# Patient Record
Sex: FEMALE | Race: OTHER | Employment: PART TIME | URBAN - METROPOLITAN AREA
[De-identification: names, ages, dates, MRNs, and addresses within clinical notes are randomized per-mention and may not be internally consistent; named-entity substitution may affect disease eponyms.]

---

## 2017-01-25 ENCOUNTER — OFFICE VISIT (OUTPATIENT)
Dept: FAMILY MEDICINE CLINIC | Facility: CLINIC | Age: 57
End: 2017-01-25

## 2017-01-25 VITALS
HEIGHT: 64 IN | SYSTOLIC BLOOD PRESSURE: 136 MMHG | BODY MASS INDEX: 27.83 KG/M2 | DIASTOLIC BLOOD PRESSURE: 72 MMHG | TEMPERATURE: 98 F | WEIGHT: 163 LBS | HEART RATE: 55 BPM

## 2017-01-25 DIAGNOSIS — H66.011 ACUTE SUPPURATIVE OTITIS MEDIA OF RIGHT EAR WITH SPONTANEOUS RUPTURE OF TYMPANIC MEMBRANE, RECURRENCE NOT SPECIFIED: Primary | ICD-10-CM

## 2017-01-25 PROCEDURE — 99213 OFFICE O/P EST LOW 20 MIN: CPT | Performed by: PHYSICIAN ASSISTANT

## 2017-01-25 PROCEDURE — 99212 OFFICE O/P EST SF 10 MIN: CPT | Performed by: PHYSICIAN ASSISTANT

## 2017-01-25 RX ORDER — AMOXICILLIN AND CLAVULANATE POTASSIUM 875; 125 MG/1; MG/1
1 TABLET, FILM COATED ORAL 2 TIMES DAILY
Qty: 20 TABLET | Refills: 0 | Status: SHIPPED | OUTPATIENT
Start: 2017-01-25 | End: 2017-02-04

## 2017-01-25 NOTE — PROGRESS NOTES
HPI:    Patient ID: Zully Morales is a 55year old female. HPI Comments: Patient presents for pain in right ear that began yesterday. She states felt episode of dizziness earlier today and felt off balance but has since resolved.   She feels chills on and Psychiatric: She has a normal mood and affect.               ASSESSMENT/PLAN:   Acute suppurative otitis media of right ear with spontaneous rupture of tympanic membrane, recurrence not specified  (primary encounter diagnosis)  -Augmentin 875 mg BID for 1

## 2017-10-19 ENCOUNTER — OFFICE VISIT (OUTPATIENT)
Dept: FAMILY MEDICINE CLINIC | Facility: CLINIC | Age: 57
End: 2017-10-19

## 2017-10-19 VITALS
HEART RATE: 64 BPM | WEIGHT: 169 LBS | BODY MASS INDEX: 28.85 KG/M2 | SYSTOLIC BLOOD PRESSURE: 110 MMHG | DIASTOLIC BLOOD PRESSURE: 67 MMHG | HEIGHT: 64 IN

## 2017-10-19 DIAGNOSIS — R93.0 ABNORMAL MRI OF HEAD: ICD-10-CM

## 2017-10-19 DIAGNOSIS — Z23 NEED FOR VACCINATION: ICD-10-CM

## 2017-10-19 DIAGNOSIS — Z00.00 ANNUAL PHYSICAL EXAM: Primary | ICD-10-CM

## 2017-10-19 DIAGNOSIS — N95.1 POST MENOPAUSAL SYNDROME: ICD-10-CM

## 2017-10-19 DIAGNOSIS — Z12.31 VISIT FOR SCREENING MAMMOGRAM: ICD-10-CM

## 2017-10-19 PROCEDURE — 90736 HZV VACCINE LIVE SUBQ: CPT | Performed by: FAMILY MEDICINE

## 2017-10-19 PROCEDURE — 99396 PREV VISIT EST AGE 40-64: CPT | Performed by: FAMILY MEDICINE

## 2017-10-19 PROCEDURE — 90471 IMMUNIZATION ADMIN: CPT | Performed by: FAMILY MEDICINE

## 2017-10-19 NOTE — PROGRESS NOTES
Physical      Patient's past medical surgical family social history was reviewed. Review of Systems  Allergic: no environmental allergies or food allergies  Cardiovascular: no chest pain, irregular heartbeat, or palpitations.   Constitutional: no fever o Future  - XR DEXA BONE DENSITOMETRY (CPT=84852); Future  - LONDON SCREENING BILAT (CPT=77815); Future  - LIPID PANEL; Future  - ASSAY, THYROID STIM HORMONE; Future  - GLUCOSE, SERUM; Future    2.  Abnormal MRI of head  Repeat   Results to Dr. Ivar Councilman  - MRI B

## 2017-11-11 ENCOUNTER — HOSPITAL ENCOUNTER (OUTPATIENT)
Dept: MRI IMAGING | Age: 57
Discharge: HOME | End: 2017-11-11
Attending: FAMILY MEDICINE
Payer: COMMERCIAL

## 2017-11-11 ENCOUNTER — HOSPITAL ENCOUNTER (OUTPATIENT)
Dept: MAMMOGRAPHY | Age: 57
Discharge: HOME | End: 2017-11-11
Attending: FAMILY MEDICINE
Payer: COMMERCIAL

## 2017-11-11 ENCOUNTER — APPOINTMENT (OUTPATIENT)
Dept: LAB | Age: 57
End: 2017-11-11
Attending: FAMILY MEDICINE
Payer: COMMERCIAL

## 2017-11-11 ENCOUNTER — HOSPITAL ENCOUNTER (OUTPATIENT)
Dept: BONE DENSITY | Age: 57
Discharge: HOME | End: 2017-11-11
Attending: FAMILY MEDICINE
Payer: COMMERCIAL

## 2017-11-11 DIAGNOSIS — Z00.00 ANNUAL PHYSICAL EXAM: ICD-10-CM

## 2017-11-11 DIAGNOSIS — Z12.31 VISIT FOR SCREENING MAMMOGRAM: ICD-10-CM

## 2017-11-11 DIAGNOSIS — N95.1 POST MENOPAUSAL SYNDROME: ICD-10-CM

## 2017-11-11 DIAGNOSIS — R93.0 ABNORMAL MRI OF HEAD: ICD-10-CM

## 2017-11-11 PROCEDURE — 36415 COLL VENOUS BLD VENIPUNCTURE: CPT

## 2017-11-11 PROCEDURE — A9575 INJ GADOTERATE MEGLUMI 0.1ML: HCPCS | Performed by: FAMILY MEDICINE

## 2017-11-11 PROCEDURE — 77080 DXA BONE DENSITY AXIAL: CPT | Performed by: FAMILY MEDICINE

## 2017-11-11 PROCEDURE — 80061 LIPID PANEL: CPT

## 2017-11-11 PROCEDURE — 82947 ASSAY GLUCOSE BLOOD QUANT: CPT

## 2017-11-11 PROCEDURE — 70553 MRI BRAIN STEM W/O & W/DYE: CPT | Performed by: FAMILY MEDICINE

## 2017-11-11 PROCEDURE — 77067 SCR MAMMO BI INCL CAD: CPT | Performed by: FAMILY MEDICINE

## 2017-11-11 PROCEDURE — 84443 ASSAY THYROID STIM HORMONE: CPT

## 2017-11-22 ENCOUNTER — TELEPHONE (OUTPATIENT)
Dept: OTHER | Age: 57
End: 2017-11-22

## 2017-11-22 DIAGNOSIS — M85.80 OSTEOPENIA, UNSPECIFIED LOCATION: Primary | ICD-10-CM

## 2017-11-22 RX ORDER — MULTIVIT-MIN/IRON/FOLIC ACID/K 18-600-40
1 CAPSULE ORAL 2 TIMES DAILY
Qty: 60 CAPSULE | Refills: 11 | Status: SHIPPED | OUTPATIENT
Start: 2017-11-22 | End: 2017-12-22

## 2017-11-22 RX ORDER — ERGOCALCIFEROL 1.25 MG/1
50000 CAPSULE ORAL
Qty: 12 CAPSULE | Refills: 3 | Status: SHIPPED | OUTPATIENT
Start: 2017-11-22 | End: 2018-10-04

## 2017-11-22 NOTE — TELEPHONE ENCOUNTER
----- Message from [facility], DO sent at 11/20/2017 10:35 AM CST -----  Mild bone loss  Patient does not have osteoporosis yet but she does have osteopenia which is a step before osteoporosis.   Recheck a vitamin D level diagnosis osteopenia and the

## 2017-11-22 NOTE — TELEPHONE ENCOUNTER
Pt notified of below using  #[id number].  Pt verbalized understanding and was able to repeat instructions, lab order placed, rx sent to pharmacy for prescription strength and otc vitamin d asking if insurance does not pay assist pt with getting corre

## 2017-11-24 ENCOUNTER — APPOINTMENT (OUTPATIENT)
Dept: LAB | Age: 57
End: 2017-11-24
Attending: FAMILY MEDICINE
Payer: COMMERCIAL

## 2017-11-24 DIAGNOSIS — M85.80 OSTEOPENIA, UNSPECIFIED LOCATION: ICD-10-CM

## 2017-11-24 PROCEDURE — 82306 VITAMIN D 25 HYDROXY: CPT

## 2017-11-24 PROCEDURE — 36415 COLL VENOUS BLD VENIPUNCTURE: CPT

## 2017-11-30 ENCOUNTER — TELEPHONE (OUTPATIENT)
Dept: FAMILY MEDICINE CLINIC | Facility: CLINIC | Age: 57
End: 2017-11-30

## 2017-11-30 NOTE — TELEPHONE ENCOUNTER
call #[phone]    Spoke with patient and she states she picked up Vitamin D Rx, but pharmacy needs clarification on Calcium dose.     Spoke with Hamida Fishman at [address] [facility] and verified receipt of Rx and dose and that it is covered by patient's

## 2017-11-30 NOTE — TELEPHONE ENCOUNTER
Egyptian SPEAKER -  Pt calling requesting to speak to nurse. Pt states she was told to take an OTC med along with the rx sent by the Dr but does not recall name or dosage.  Please Adriano Vargas

## 2017-11-30 NOTE — TELEPHONE ENCOUNTER
LMTCB in [address] [facility]. Please soft transfer to Triage any time.       Copied from 11/22/17 encounter:    Message from Celio Max DO sent at 11/20/2017 10:35 AM CST -----  Mild bone loss  Patient does not have osteoporosis yet but she does have osteopenia

## 2017-12-02 ENCOUNTER — TELEPHONE (OUTPATIENT)
Dept: OTHER | Age: 57
End: 2017-12-02

## 2017-12-02 NOTE — TELEPHONE ENCOUNTER
Verified name and . Results/recommendations reviewed with pt and pt verb understanding. Assisted via [employer] and Temnos [id number]. Pt states she did not receive Calcium rx from pharmacy.   Informed pt according to [address] [place] [address] notes on [facility]

## 2017-12-02 NOTE — TELEPHONE ENCOUNTER
----- Message from Aditya Kapoor DO sent at 11/30/2017  1:57 PM CST -----  Vitamin D level is low.   Begin 50,000 units vitamin D weekly #12 with 3 refills

## 2018-01-03 ENCOUNTER — OFFICE VISIT (OUTPATIENT)
Dept: FAMILY MEDICINE CLINIC | Facility: CLINIC | Age: 58
End: 2018-01-03

## 2018-01-03 VITALS
DIASTOLIC BLOOD PRESSURE: 79 MMHG | HEIGHT: 64 IN | BODY MASS INDEX: 28.34 KG/M2 | SYSTOLIC BLOOD PRESSURE: 126 MMHG | WEIGHT: 166 LBS | HEART RATE: 62 BPM

## 2018-01-03 DIAGNOSIS — E55.9 VITAMIN D DEFICIENCY: ICD-10-CM

## 2018-01-03 DIAGNOSIS — R93.0 ABNORMAL MRI OF HEAD: ICD-10-CM

## 2018-01-03 DIAGNOSIS — E78.00 PURE HYPERCHOLESTEROLEMIA: Primary | ICD-10-CM

## 2018-01-03 PROCEDURE — 99212 OFFICE O/P EST SF 10 MIN: CPT | Performed by: FAMILY MEDICINE

## 2018-01-03 PROCEDURE — 99214 OFFICE O/P EST MOD 30 MIN: CPT | Performed by: FAMILY MEDICINE

## 2018-01-03 RX ORDER — IBUPROFEN 200 MG
600 CAPSULE ORAL 2 TIMES DAILY
Qty: 180 TABLET | Refills: 3 | Status: SHIPPED | OUTPATIENT
Start: 2018-01-03

## 2018-01-03 RX ORDER — ATORVASTATIN CALCIUM 20 MG/1
20 TABLET, FILM COATED ORAL NIGHTLY
Qty: 30 TABLET | Refills: 3 | Status: SHIPPED | OUTPATIENT
Start: 2018-01-03 | End: 2018-01-03

## 2018-01-03 RX ORDER — MEFLOQUINE HYDROCHLORIDE 250 MG/1
250 TABLET ORAL
Qty: 6 TABLET | Refills: 0 | Status: SHIPPED | OUTPATIENT
Start: 2018-01-03 | End: 2018-10-04

## 2018-01-03 NOTE — PROGRESS NOTES
Blood pressure 126/79, pulse 62, height 5' 4\" (1.626 m), weight 166 lb (75.3 kg). Presents today following up for MRI results for headaches also low vitamin D levels. She will be traveling to [place] next week and is asking about vaccinations.   She r

## 2018-02-27 ENCOUNTER — OFFICE VISIT (OUTPATIENT)
Dept: FAMILY MEDICINE CLINIC | Facility: CLINIC | Age: 58
End: 2018-02-27

## 2018-02-27 VITALS
HEART RATE: 61 BPM | WEIGHT: 171.13 LBS | DIASTOLIC BLOOD PRESSURE: 78 MMHG | BODY MASS INDEX: 29 KG/M2 | SYSTOLIC BLOOD PRESSURE: 124 MMHG

## 2018-02-27 DIAGNOSIS — E78.00 PURE HYPERCHOLESTEROLEMIA: Primary | ICD-10-CM

## 2018-02-27 PROCEDURE — 99212 OFFICE O/P EST SF 10 MIN: CPT | Performed by: FAMILY MEDICINE

## 2018-02-27 PROCEDURE — 99213 OFFICE O/P EST LOW 20 MIN: CPT | Performed by: FAMILY MEDICINE

## 2018-02-27 RX ORDER — PRAVASTATIN SODIUM 10 MG
10 TABLET ORAL NIGHTLY
Qty: 30 TABLET | Refills: 3 | Status: SHIPPED | OUTPATIENT
Start: 2018-02-27 | End: 2018-10-04

## 2018-02-27 RX ORDER — ATORVASTATIN CALCIUM 20 MG/1
TABLET, FILM COATED ORAL
Refills: 3 | COMMUNITY
Start: 2018-01-31 | End: 2018-02-27

## 2018-02-28 NOTE — PATIENT INSTRUCTIONS
Panel de lípidos  ¿Tiene ana paula análisis otros nombres? Perfil de lípidos, perfil de lipoproteínas. ¿Qué es ana paula análisis? Ana Paula soraida de análisis mide la cantidad de colesterol y otras grasas en mackay vick.   El colesterol y los triglicéridos son Carlotta Stapler · Electrocardiograma o ECG, que evalúa los impulsos eléctricos de mackay corazón para mina si está latiendo con normalidad  · Prueba de esfuerzo cardíaco, en la que usted puede tener que hacer ejercicio mientras es monitoreado por un ECG  · [place]ud[place], que Denisha niveles de colesterol HDL deben ser superiores a 40 mg/dL. En realidad, margie tipo de grasa es buena para usted porque reduce mackay riesgo de enfermedad cardíaca. Cuanto más alto sea el valor, más bajo será mackay riesgo.  Se considera que un nivel de 60 mg/dL Es posible que no deba comer ni beber nada, excepto agua, yancy 12 a 14 horas antes de [facility].  Además, asegúrese de que mackay proveedor de atención médica sepa todos los [address], los productos a base de hierbas, las vitaminas y los suplementos q · Colesterol HDL (lipoproteínas de ray densidad). Ana Paula colesterol es llamado “holden” porque ayuda a eliminar el colesterol LDL de la vick.   · Triglicéridos. Los triglicéridos son Sunitha Roys forma especial de grasa que el organismo [place] para almacenar energía. · Estatinas (inhibidores de la HMG-CoA reductasa). Las estatinas inhiben la producción de colesterol en el organismo y se consideran el mejor medicamento para reducirlo.  Ventajas: Las estatinas reducen el nivel de colesterol LDL, aumentan ligeramente el ni · Ácidos grasos omega-3. Reducen la cantidad de triglicéridos que produce el organismo y [place] a eliminar estos lípidos de la vick.  Los ácidos grasos omega-3 están presentes en muchos alimentos, lina el salmón y otros pescados grasos, así lina e · Adultos que tienen diabetes. O adultos que tienen un mayor riesgo de tener un ataque cardíaco o un ataque cerebral y tienen un nivel de colesterol LDL de 70 a 189 mg/dL.   · Adultos de 21 años o más que tienen un nivel de colesterol LDL de 190 mg/dL o sup ¨ Lleve la cuenta de lo que randall. Si [place] varios medicamentos diferentes, dragan lista o tabla puede ayudarle a nilsa las pastillas correctas en el momento oportuno.  Usar un pastillero con los días de la semana o las horas del día también es dragan buena forma de · Reducir la cantidad de sodio (sal) que come, especialmente, si tiene presión arterial ray  · Aumentar el consumo de frutas y vegetales frescos  · [place] proteínas magras, tales lina pescado, aves y legumbres (frijoles y arvejas o chícharos)   · [place] men Si fuma, deje de hacerlo. [place]dale a mackay proveedor de [employer] acerca de medicamentos que pueden ayudarlo a combatir la compulsión por fumar. Inscribirse en un programa para dejar de fumar puede mejorar mak posibilidades de tener éxito.  Pid Ana Paula análisis de vick mide los niveles de colesterol de mackay cuerpo. Un panel de lípidos mostrará los niveles de mackay colesterol total, mackay colesterol de lipoproteína de baja densidad (\"LDL\", por mak siglas en inglés) o \"darian\" y mackay colesterol de lipoprote Lo ideal es que el colesterol HDL sea superior a 40 mg/dL para los hombres y superior a 50 mg/dL para las mujeres. Cuanto más alto sea mackay nivel de HDL, mejor.   El análisis de colesterol no HDL, por lo general, no forma parte de la medición de mackay colesterol Aunque no existen estándares oscar para los niveles de colesterol no HDL, la mayoría de los expertos en medicina creen que bajar el nivel de colesterol LDL y no HDL al mismo tiempo disminuirá mackay riesgo de enfermedad cardíaca.   Según las [employer] Program © 0537-6361 [address]. [address] [facility], [address] [facility]. Todos los derechos reservados. Esta información no pretende sustituir la atención médica profesional. Sólo mackay médico puede diagnosticar y tratar un problema de ernesto. · De 160 a 189 mg/dL: alto  · Superior o igual a 190 mg/dL: muy alto  Aquí se describen los niveles de colesterol total y cómo afectan la ernesto:  · Menos de 200 mg/dL: tiene un bajo riesgo de enfermedad cardíaca  · De 200 a 240 mg/dL: límite kellen  · [employer] Las personas con antecedentes de enfermedad arterial, ataque cerebral, enfermedad renal o diabetes también tienen un riesgo más alto de enfermedad cardíaca.  Por lo tanto, es posible que también se realicen análisis para detectar estos problemas.   ¿Qué sig Un análisis de lípidos puede realizarse estando o no en ayunas. Sin embargo, si se mide el nivel de mak triglicéridos, es posible que deba estar en [place], lo que significa que solo puede beber agua yancy 12 a 14 horas antes del análisis.     © [id number]

## 2018-02-28 NOTE — PROGRESS NOTES
Blood pressure 124/78, pulse 61, weight 171 lb 2 oz (77.6 kg). Patient presents today following up for hyperlipidemia reports the atorvastatin was making her nauseous. She stopped taking it and felt better. She otherwise feels well.     Objective franke

## 2018-04-16 ENCOUNTER — TELEPHONE (OUTPATIENT)
Dept: FAMILY MEDICINE CLINIC | Facility: CLINIC | Age: 58
End: 2018-04-16

## 2018-04-16 DIAGNOSIS — Z91.89 ENCOUNTER FOR HEPATITIS C VIRUS SCREENING TEST FOR HIGH RISK PATIENT: Primary | ICD-10-CM

## 2018-04-16 DIAGNOSIS — Z11.59 ENCOUNTER FOR HEPATITIS C VIRUS SCREENING TEST FOR HIGH RISK PATIENT: Primary | ICD-10-CM

## 2018-04-17 ENCOUNTER — LAB ENCOUNTER (OUTPATIENT)
Dept: LAB | Age: 58
End: 2018-04-17
Attending: FAMILY MEDICINE
Payer: COMMERCIAL

## 2018-04-17 DIAGNOSIS — Z91.89 ENCOUNTER FOR HEPATITIS C VIRUS SCREENING TEST FOR HIGH RISK PATIENT: ICD-10-CM

## 2018-04-17 DIAGNOSIS — E78.00 PURE HYPERCHOLESTEROLEMIA: ICD-10-CM

## 2018-04-17 DIAGNOSIS — Z11.59 ENCOUNTER FOR HEPATITIS C VIRUS SCREENING TEST FOR HIGH RISK PATIENT: ICD-10-CM

## 2018-04-17 PROCEDURE — 80500 HEPATITIS A B + C PROFILE: CPT

## 2018-04-17 PROCEDURE — 86708 HEPATITIS A ANTIBODY: CPT

## 2018-04-17 PROCEDURE — 82306 VITAMIN D 25 HYDROXY: CPT

## 2018-04-17 PROCEDURE — 36415 COLL VENOUS BLD VENIPUNCTURE: CPT

## 2018-04-17 PROCEDURE — 86709 HEPATITIS A IGM ANTIBODY: CPT

## 2018-04-17 PROCEDURE — 87340 HEPATITIS B SURFACE AG IA: CPT

## 2018-04-17 PROCEDURE — 86704 HEP B CORE ANTIBODY TOTAL: CPT

## 2018-04-17 PROCEDURE — 86706 HEP B SURFACE ANTIBODY: CPT

## 2018-04-17 PROCEDURE — 86803 HEPATITIS C AB TEST: CPT

## 2018-04-20 ENCOUNTER — TELEPHONE (OUTPATIENT)
Dept: FAMILY MEDICINE CLINIC | Facility: CLINIC | Age: 58
End: 2018-04-20

## 2018-04-25 NOTE — TELEPHONE ENCOUNTER
Advised patient on Dr. Eduardo Garcia information. Patient verbalized understanding, had no questions.      Sushma Knight #[id number]

## 2018-10-04 ENCOUNTER — OFFICE VISIT (OUTPATIENT)
Dept: FAMILY MEDICINE CLINIC | Facility: CLINIC | Age: 58
End: 2018-10-04
Payer: COMMERCIAL

## 2018-10-04 VITALS
BODY MASS INDEX: 30.07 KG/M2 | HEIGHT: 64 IN | DIASTOLIC BLOOD PRESSURE: 78 MMHG | WEIGHT: 176.13 LBS | SYSTOLIC BLOOD PRESSURE: 139 MMHG | HEART RATE: 62 BPM

## 2018-10-04 DIAGNOSIS — K63.5 POLYP OF COLON, UNSPECIFIED PART OF COLON, UNSPECIFIED TYPE: Primary | ICD-10-CM

## 2018-10-04 DIAGNOSIS — E55.9 VITAMIN D DEFICIENCY: ICD-10-CM

## 2018-10-04 DIAGNOSIS — E78.00 PURE HYPERCHOLESTEROLEMIA: ICD-10-CM

## 2018-10-04 PROCEDURE — 99212 OFFICE O/P EST SF 10 MIN: CPT | Performed by: FAMILY MEDICINE

## 2018-10-04 PROCEDURE — 99213 OFFICE O/P EST LOW 20 MIN: CPT | Performed by: FAMILY MEDICINE

## 2018-10-04 NOTE — PROGRESS NOTES
Blood pressure 139/78, pulse 62, height 5' 4\" (1.626 m), weight 176 lb 2 oz (79.9 kg). Patient presents today following up for hyperlipidemia had ear pain for the past several days but has improved. No fever no chills over the past day.   She does not

## 2018-10-27 ENCOUNTER — APPOINTMENT (OUTPATIENT)
Dept: LAB | Age: 58
End: 2018-10-27
Attending: FAMILY MEDICINE
Payer: COMMERCIAL

## 2018-10-27 DIAGNOSIS — E55.9 VITAMIN D DEFICIENCY: ICD-10-CM

## 2018-10-27 DIAGNOSIS — E78.00 PURE HYPERCHOLESTEROLEMIA: ICD-10-CM

## 2018-10-27 PROCEDURE — 84460 ALANINE AMINO (ALT) (SGPT): CPT

## 2018-10-27 PROCEDURE — 82306 VITAMIN D 25 HYDROXY: CPT

## 2018-10-27 PROCEDURE — 84443 ASSAY THYROID STIM HORMONE: CPT

## 2018-10-27 PROCEDURE — 80048 BASIC METABOLIC PNL TOTAL CA: CPT

## 2018-10-27 PROCEDURE — 36415 COLL VENOUS BLD VENIPUNCTURE: CPT

## 2018-10-27 PROCEDURE — 84450 TRANSFERASE (AST) (SGOT): CPT

## 2018-10-27 PROCEDURE — 80061 LIPID PANEL: CPT

## 2018-10-29 RX ORDER — ERGOCALCIFEROL 1.25 MG/1
CAPSULE ORAL
Qty: 12 CAPSULE | Refills: 11 | Status: SHIPPED | OUTPATIENT
Start: 2018-10-29 | End: 2019-12-17

## 2018-10-29 NOTE — TELEPHONE ENCOUNTER
Review pended refill request as it does not fall under a protocol.   [facility]. [facility] do you approve refill request? Last Vit D was done on 10/27/2018 it was 25.2    Last Rx: 000  LOV: 000

## 2018-11-15 ENCOUNTER — TELEPHONE (OUTPATIENT)
Dept: FAMILY MEDICINE CLINIC | Facility: CLINIC | Age: 58
End: 2018-11-15

## 2018-11-15 ENCOUNTER — OFFICE VISIT (OUTPATIENT)
Dept: GASTROENTEROLOGY | Facility: CLINIC | Age: 58
End: 2018-11-15
Payer: COMMERCIAL

## 2018-11-15 ENCOUNTER — TELEPHONE (OUTPATIENT)
Dept: GASTROENTEROLOGY | Facility: CLINIC | Age: 58
End: 2018-11-15

## 2018-11-15 VITALS
HEIGHT: 64 IN | SYSTOLIC BLOOD PRESSURE: 140 MMHG | HEART RATE: 60 BPM | DIASTOLIC BLOOD PRESSURE: 80 MMHG | BODY MASS INDEX: 30.22 KG/M2 | WEIGHT: 177 LBS

## 2018-11-15 DIAGNOSIS — K63.5 POLYP OF COLON, UNSPECIFIED PART OF COLON, UNSPECIFIED TYPE: Primary | ICD-10-CM

## 2018-11-15 DIAGNOSIS — K64.8 INTERNAL HEMORRHOIDS: ICD-10-CM

## 2018-11-15 DIAGNOSIS — Z86.010 HX OF COLONIC POLYPS: Primary | ICD-10-CM

## 2018-11-15 DIAGNOSIS — K62.5 RECTAL BLEEDING: ICD-10-CM

## 2018-11-15 PROCEDURE — 99243 OFF/OP CNSLTJ NEW/EST LOW 30: CPT | Performed by: INTERNAL MEDICINE

## 2018-11-15 PROCEDURE — 99212 OFFICE O/P EST SF 10 MIN: CPT | Performed by: INTERNAL MEDICINE

## 2018-11-15 NOTE — PATIENT INSTRUCTIONS
Colonoscopy for history of colon polyp ( 2013)  - colyte prep  - IV or MAC sedation    Please see Dr. Aileen Cassidy about your breathing issues ( before the colonoscopy)

## 2018-11-15 NOTE — TELEPHONE ENCOUNTER
Please contact patient needs to schedule appt to see me. Received note from Dr. Mak Beaver patient complaining of intermittent SOB.

## 2018-11-15 NOTE — PROGRESS NOTES
Sp Moreno is a 65year old female. HPI:   Patient presents with:  Colonoscopy Screening    The patient is a 64-year-old female who has a history of colon polyp x1 and internal hemorrhoids noted on prior colonoscopy from 2013.   She is here today to s dose. Take as directed Disp: 1 Bottle Rfl: 0   ERGOCALCIFEROL 53405 units Oral Cap TAKE ONE CAPSULE BY MOUTH EVERY 7 DAYS Disp: 12 capsule Rfl: 11   Calcium 600 MG Oral Tab Take 600 mg by mouth 2 (two) times daily.  Disp: 180 tablet Rfl: 3       Allergies: Visit:  Requested Prescriptions     Signed Prescriptions Disp Refills   • PEG 3350-KCl-NaBcb-NaCl-NaSulf (COLYTE WITH FLAVOR PACKS) 240 g Oral Recon Soln 1 Bottle 0     Sig: Take 4,000 mL (4 L total) by mouth one time for 1 dose.  Take as directed       Lora

## 2018-11-16 NOTE — TELEPHONE ENCOUNTER
Scheduled for:  Colonoscopy 51895  Provider Name: Dr Bonnie Call  Date:  Sat 1/19/19  Location:  [facility]  Sedation:  IV  Time:  9:00 am  Prep: split dose colyte  Meds/Allergies Reconciled?:  NKDA  Diagnosis with codes:  HCP Z86.010  Was patient informed to call insu

## 2018-11-21 ENCOUNTER — LAB ENCOUNTER (OUTPATIENT)
Dept: LAB | Age: 58
End: 2018-11-21
Attending: FAMILY MEDICINE
Payer: COMMERCIAL

## 2018-11-21 ENCOUNTER — HOSPITAL ENCOUNTER (OUTPATIENT)
Dept: GENERAL RADIOLOGY | Age: 58
Discharge: HOME | End: 2018-11-21
Attending: FAMILY MEDICINE
Payer: COMMERCIAL

## 2018-11-21 ENCOUNTER — OFFICE VISIT (OUTPATIENT)
Dept: FAMILY MEDICINE CLINIC | Facility: CLINIC | Age: 58
End: 2018-11-21
Payer: COMMERCIAL

## 2018-11-21 VITALS
DIASTOLIC BLOOD PRESSURE: 72 MMHG | HEIGHT: 64 IN | WEIGHT: 177 LBS | SYSTOLIC BLOOD PRESSURE: 114 MMHG | HEART RATE: 66 BPM | BODY MASS INDEX: 30.22 KG/M2

## 2018-11-21 DIAGNOSIS — R06.00 DOE (DYSPNEA ON EXERTION): ICD-10-CM

## 2018-11-21 DIAGNOSIS — R06.00 DOE (DYSPNEA ON EXERTION): Primary | ICD-10-CM

## 2018-11-21 PROCEDURE — 93010 ELECTROCARDIOGRAM REPORT: CPT | Performed by: FAMILY MEDICINE

## 2018-11-21 PROCEDURE — 93005 ELECTROCARDIOGRAM TRACING: CPT

## 2018-11-21 PROCEDURE — 99214 OFFICE O/P EST MOD 30 MIN: CPT | Performed by: FAMILY MEDICINE

## 2018-11-21 PROCEDURE — 36415 COLL VENOUS BLD VENIPUNCTURE: CPT

## 2018-11-21 PROCEDURE — 85025 COMPLETE CBC W/AUTO DIFF WBC: CPT

## 2018-11-21 PROCEDURE — 71046 X-RAY EXAM CHEST 2 VIEWS: CPT | Performed by: FAMILY MEDICINE

## 2018-11-21 NOTE — PROGRESS NOTES
Blood pressure 114/72, pulse 66, height 5' 4\" (1.626 m), weight 177 lb (80.3 kg). Patient presents today complaining of a one month history of feeling dyspneic on exertion. Particularly when going up stairs.   Occasional palpitations denies any  syncop

## 2018-11-23 ENCOUNTER — TELEPHONE (OUTPATIENT)
Dept: FAMILY MEDICINE CLINIC | Facility: CLINIC | Age: 58
End: 2018-11-23

## 2018-11-23 NOTE — TELEPHONE ENCOUNTER
----- Message from Gwen Myers DO sent at 11/21/2018  5:08 PM CST -----  No anemia on cbc patient to proceed with cardiac studies ordered including stress echo when approved by insurance.

## 2018-12-01 NOTE — TELEPHONE ENCOUNTER
Spoke to patient verified information. Made patient aware of results, patient verbalized understanding.

## 2019-01-15 ENCOUNTER — TELEPHONE (OUTPATIENT)
Dept: FAMILY MEDICINE CLINIC | Facility: CLINIC | Age: 59
End: 2019-01-15

## 2019-01-15 RX ORDER — MEFLOQUINE HYDROCHLORIDE 250 MG/1
250 TABLET ORAL
Qty: 7 TABLET | Refills: 0 | Status: SHIPPED | OUTPATIENT
Start: 2019-01-15 | End: 2019-06-27

## 2019-01-15 NOTE — TELEPHONE ENCOUNTER
SEE ABOVE PATIENT TO START TAKING MEFLOQUINE WEEKLY 2 WEEKS PRIOR TO DEPARTURE WEEKLY WHILE IN [place] AND WEEKLY FOR FOUR WEEKS AFTER RETURN No

## 2019-01-15 NOTE — TELEPHONE ENCOUNTER
Spoke with patient who confirmed she will be traveling to the [facility] of [facility], and her return date is 1/31/19. Routed to provider for review.      Please reply to donna: TANNA Zepeda

## 2019-01-15 NOTE — TELEPHONE ENCOUNTER
Message routed to provider to confirm if an rx for chloroquine can be provided to the patient.      Please reply to pool: TANNA Plata

## 2019-01-19 ENCOUNTER — HOSPITAL ENCOUNTER (OUTPATIENT)
Facility: HOSPITAL | Age: 59
Setting detail: HOSPITAL OUTPATIENT SURGERY
Discharge: HOME | End: 2019-01-19
Attending: INTERNAL MEDICINE | Admitting: INTERNAL MEDICINE
Payer: COMMERCIAL

## 2019-01-19 VITALS
HEART RATE: 58 BPM | SYSTOLIC BLOOD PRESSURE: 125 MMHG | BODY MASS INDEX: 29.02 KG/M2 | WEIGHT: 170 LBS | DIASTOLIC BLOOD PRESSURE: 67 MMHG | OXYGEN SATURATION: 100 % | RESPIRATION RATE: 15 BRPM | HEIGHT: 64 IN

## 2019-01-19 DIAGNOSIS — Z86.010 HX OF COLONIC POLYPS: ICD-10-CM

## 2019-01-19 PROCEDURE — 0DBK8ZX EXCISION OF ASCENDING COLON, VIA NATURAL OR ARTIFICIAL OPENING ENDOSCOPIC, DIAGNOSTIC: ICD-10-PCS | Performed by: INTERNAL MEDICINE

## 2019-01-19 PROCEDURE — 45380 COLONOSCOPY AND BIOPSY: CPT | Performed by: INTERNAL MEDICINE

## 2019-01-19 PROCEDURE — 0DBL8ZX EXCISION OF TRANSVERSE COLON, VIA NATURAL OR ARTIFICIAL OPENING ENDOSCOPIC, DIAGNOSTIC: ICD-10-PCS | Performed by: INTERNAL MEDICINE

## 2019-01-19 PROCEDURE — G0500 MOD SEDAT ENDO SERVICE >5YRS: HCPCS | Performed by: INTERNAL MEDICINE

## 2019-01-19 PROCEDURE — 45385 COLONOSCOPY W/LESION REMOVAL: CPT | Performed by: INTERNAL MEDICINE

## 2019-01-19 RX ORDER — SODIUM CHLORIDE 0.9 % (FLUSH) 0.9 %
10 SYRINGE (ML) INJECTION AS NEEDED
Status: DISCONTINUED | OUTPATIENT
Start: 2019-01-19 | End: 2019-01-19

## 2019-01-19 RX ORDER — MIDAZOLAM HYDROCHLORIDE 1 MG/ML
1 INJECTION INTRAMUSCULAR; INTRAVENOUS EVERY 5 MIN PRN
Status: DISCONTINUED | OUTPATIENT
Start: 2019-01-19 | End: 2019-01-19

## 2019-01-19 RX ORDER — SODIUM CHLORIDE, SODIUM LACTATE, POTASSIUM CHLORIDE, CALCIUM CHLORIDE 600; 310; 30; 20 MG/100ML; MG/100ML; MG/100ML; MG/100ML
INJECTION, SOLUTION INTRAVENOUS CONTINUOUS
Status: DISCONTINUED | OUTPATIENT
Start: 2019-01-19 | End: 2019-01-19

## 2019-01-19 RX ORDER — MIDAZOLAM HYDROCHLORIDE 1 MG/ML
INJECTION INTRAMUSCULAR; INTRAVENOUS
Status: DISCONTINUED | OUTPATIENT
Start: 2019-01-19 | End: 2019-01-19

## 2019-01-19 NOTE — OPERATIVE REPORT
[facility] HOSP - [facility] Endoscopy Report      Preoperative Diagnosis:  - history of colon polyp - 2013 last exam    Postoperative Diagnosis:  - colon polyp x 2  - diverticulosis  - internal hemorrhoids      Procedure:    Colonoscopy      Surgeon:  Margot Ferrer

## 2019-01-19 NOTE — H&P
History & Physical Examination    Patient Name: Deborah Box  MRN: [id number]  [facility]: [id number]  YOB: 1960    Diagnosis: history of colon polyp 2013  Perianal lesion    Cardiac workup review, EKG unchanged. No current symptoms.      Davie Li Smokeless tobacco: Never Used    Alcohol use: No      Alcohol/week: 0.0 oz      SYSTEM Check if Review is Normal Check if Physical Exam is Normal If not normal, please explain:   HEENT [x ] [ x]    NECK & BACK [x ] [x ]    HEART [x ] [ x]    LUNGS [x ]

## 2019-01-31 ENCOUNTER — TELEPHONE (OUTPATIENT)
Dept: GASTROENTEROLOGY | Facility: CLINIC | Age: 59
End: 2019-01-31

## 2019-01-31 NOTE — TELEPHONE ENCOUNTER
----- Message from Elizabeth Mayberry MD sent at 1/29/2019  6:03 PM CST -----  RN place on call back for colon for 5 years  - please contact pt - [place] speaking  - follow up with primary to complete heart workup  - colon polyps ok - adenomatous and thus ca

## 2019-02-22 NOTE — TELEPHONE ENCOUNTER
# [id number]    Patient returned call. Name and  verified. Patient informed of results and recommendations. Patient verbalized understanding. Phone number for surgeon given. 5 Year colonoscopy recall placed in system per Dr. Lissett Bee .  Colonosc

## 2019-02-24 NOTE — TELEPHONE ENCOUNTER
Review pended refill request as it does not fall under a protocol.     Last Rx: 1-15-19  LOV: 11-21-18

## 2019-02-25 RX ORDER — MEFLOQUINE HYDROCHLORIDE 250 MG/1
TABLET ORAL
Qty: 7 TABLET | Refills: 0 | OUTPATIENT
Start: 2019-02-25

## 2019-02-25 NOTE — TELEPHONE ENCOUNTER
Anand/Zeke's [phone] is calling for status of medication refill request. Per Lowella Dakin pt is out of medication.

## 2019-02-26 NOTE — TELEPHONE ENCOUNTER
Current Outpatient Medications:   •  Mefloquine HCl 250 MG Oral Tab, Take 1 tablet (250 mg total) by mouth every 7 days. , Disp: 7 tablet, Rfl: 0    Second request, please advise

## 2019-02-27 NOTE — TELEPHONE ENCOUNTER
Patient will need one tab weekly for four weeks after return. Please inquire how many more tabs patient will need to satisfy this requirement.

## 2019-02-28 NOTE — TELEPHONE ENCOUNTER
Pt called and stated returned on 1/31/19 and has had 3 pills or 3 doses since. Pt has 1 final pill for today.  No additional medication will be needed

## 2019-06-27 ENCOUNTER — OFFICE VISIT (OUTPATIENT)
Dept: FAMILY MEDICINE CLINIC | Facility: CLINIC | Age: 59
End: 2019-06-27
Payer: COMMERCIAL

## 2019-06-27 VITALS
TEMPERATURE: 98 F | WEIGHT: 177 LBS | HEART RATE: 64 BPM | BODY MASS INDEX: 30.22 KG/M2 | HEIGHT: 64 IN | DIASTOLIC BLOOD PRESSURE: 64 MMHG | SYSTOLIC BLOOD PRESSURE: 106 MMHG

## 2019-06-27 DIAGNOSIS — E78.00 PURE HYPERCHOLESTEROLEMIA: ICD-10-CM

## 2019-06-27 DIAGNOSIS — Z12.31 SCREENING MAMMOGRAM, ENCOUNTER FOR: Primary | ICD-10-CM

## 2019-06-27 DIAGNOSIS — E55.9 VITAMIN D DEFICIENCY: ICD-10-CM

## 2019-06-27 DIAGNOSIS — R06.00 DOE (DYSPNEA ON EXERTION): ICD-10-CM

## 2019-06-27 PROCEDURE — 99212 OFFICE O/P EST SF 10 MIN: CPT | Performed by: FAMILY MEDICINE

## 2019-06-27 PROCEDURE — 99214 OFFICE O/P EST MOD 30 MIN: CPT | Performed by: FAMILY MEDICINE

## 2019-06-27 NOTE — PROGRESS NOTES
Blood pressure 106/64, pulse 64, temperature 98.2 °F (36.8 °C), height 5' 4\" (1.626 m), weight 177 lb (80.3 kg). Following up for dyspnea on exertion. Also with hyperlipidemia. Denies chest pain she does get dyspneic ascending and descending stairs.

## 2019-06-27 NOTE — PATIENT INSTRUCTIONS
Síndrome De La Articulación Temporomandibular (EDUAR)[TMJ Syndrome]  Se trata de dragan afección con dolor crónico o recurrente en la articulación de la mandíbula (junto al oído). Ana Paula dolor puede limitar el movimiento de la [place].  También es posible que a) Intente identificar qué cosas de mackay vandana le ocasionan estrés. Hayde Michaela no sea obvio. Pueden ser, por ejemplo:  -- Complicaciones diarias que se van acumulando (embotellamientos, citas a las que no pudo asistir, problemas con el automóvil).   -- Cambios de Programe dragan VISITA DE CONTROL según le indiquen con un dentista o cirujano dental. Es posible que deba hacerse más pruebas o le indiquen algún tratamiento adicional. Si rechina los dientes por la noche, puede resultarle útil usar un dispositivo de plástic Acostúmbrese a evaluar el estado físico de mackay cuerpo varias veces al día. Pruebe a escribir dragan nota lina recordatorio o ponga un despertador en mackay reloj o en la computadora. Al hacerse dragan autorrevisión, pregúntese lo siguiente:  · ¿Me siento estresado? · Pida ayuda si la necesita. Mak amigos o mak familiares podrían hacerle diligencias, prepararle comidas y acompañarlo en mak paseos u otros tipos de ejercicio.   Manténgase activo  La actividad ayuda al cuerpo de muchas maneras; por ejemplo, ayuda a perman

## 2019-06-28 ENCOUNTER — LAB ENCOUNTER (OUTPATIENT)
Dept: LAB | Age: 59
End: 2019-06-28
Attending: FAMILY MEDICINE
Payer: COMMERCIAL

## 2019-06-28 ENCOUNTER — HOSPITAL ENCOUNTER (OUTPATIENT)
Dept: GENERAL RADIOLOGY | Age: 59
Discharge: HOME | End: 2019-06-28
Attending: FAMILY MEDICINE
Payer: COMMERCIAL

## 2019-06-28 DIAGNOSIS — E78.00 PURE HYPERCHOLESTEROLEMIA: ICD-10-CM

## 2019-06-28 DIAGNOSIS — Z12.31 SCREENING MAMMOGRAM, ENCOUNTER FOR: Primary | ICD-10-CM

## 2019-06-28 DIAGNOSIS — R06.00 DOE (DYSPNEA ON EXERTION): ICD-10-CM

## 2019-06-28 DIAGNOSIS — E55.9 VITAMIN D DEFICIENCY: ICD-10-CM

## 2019-06-28 PROCEDURE — 84450 TRANSFERASE (AST) (SGOT): CPT

## 2019-06-28 PROCEDURE — 93005 ELECTROCARDIOGRAM TRACING: CPT

## 2019-06-28 PROCEDURE — 80061 LIPID PANEL: CPT

## 2019-06-28 PROCEDURE — 93010 ELECTROCARDIOGRAM REPORT: CPT | Performed by: FAMILY MEDICINE

## 2019-06-28 PROCEDURE — 85025 COMPLETE CBC W/AUTO DIFF WBC: CPT

## 2019-06-28 PROCEDURE — 84460 ALANINE AMINO (ALT) (SGPT): CPT

## 2019-06-28 PROCEDURE — 36415 COLL VENOUS BLD VENIPUNCTURE: CPT

## 2019-06-28 PROCEDURE — 82306 VITAMIN D 25 HYDROXY: CPT

## 2019-06-28 PROCEDURE — 80048 BASIC METABOLIC PNL TOTAL CA: CPT

## 2019-06-28 PROCEDURE — 71046 X-RAY EXAM CHEST 2 VIEWS: CPT | Performed by: FAMILY MEDICINE

## 2019-06-28 PROCEDURE — 84443 ASSAY THYROID STIM HORMONE: CPT

## 2019-11-14 RX ORDER — ERGOCALCIFEROL 1.25 MG/1
CAPSULE ORAL
Qty: 12 CAPSULE | Refills: 1 | OUTPATIENT
Start: 2019-11-14

## 2019-11-14 NOTE — TELEPHONE ENCOUNTER
Review pended refill request as it does not fall under a protocol.     Requested Prescriptions     Pending Prescriptions Disp Refills   • ERGOCALCIFEROL 1.25 MG (76683 UT) Oral Cap [Pharmacy Med Name: VITAMIN D2 50,000IU (ERGO) CAP RX] 12 capsule 0     Sig:

## 2019-11-16 ENCOUNTER — APPOINTMENT (OUTPATIENT)
Dept: LAB | Age: 59
End: 2019-11-16
Attending: FAMILY MEDICINE
Payer: COMMERCIAL

## 2019-11-16 DIAGNOSIS — E78.00 PURE HYPERCHOLESTEROLEMIA: ICD-10-CM

## 2019-11-16 DIAGNOSIS — E55.9 VITAMIN D DEFICIENCY: ICD-10-CM

## 2019-11-16 PROCEDURE — 80061 LIPID PANEL: CPT

## 2019-11-16 PROCEDURE — 82306 VITAMIN D 25 HYDROXY: CPT

## 2019-11-16 PROCEDURE — 84460 ALANINE AMINO (ALT) (SGPT): CPT

## 2019-11-16 PROCEDURE — 36415 COLL VENOUS BLD VENIPUNCTURE: CPT

## 2019-11-16 PROCEDURE — 84450 TRANSFERASE (AST) (SGOT): CPT

## 2019-11-16 PROCEDURE — 84443 ASSAY THYROID STIM HORMONE: CPT

## 2019-11-19 ENCOUNTER — TELEPHONE (OUTPATIENT)
Dept: FAMILY MEDICINE CLINIC | Facility: CLINIC | Age: 59
End: 2019-11-19

## 2019-11-19 NOTE — TELEPHONE ENCOUNTER
----- Message from Josefina Cadena DO sent at 11/18/2019  5:12 PM CST -----  Cholesterol elevated vitamin d decreased. Statin and vitamin d supplementation sent. Fu blood test 3 months.

## 2019-11-29 ENCOUNTER — HOSPITAL ENCOUNTER (OUTPATIENT)
Age: 59
Discharge: HOME | End: 2019-11-29
Attending: EMERGENCY MEDICINE
Payer: COMMERCIAL

## 2019-11-29 VITALS
RESPIRATION RATE: 18 BRPM | DIASTOLIC BLOOD PRESSURE: 75 MMHG | OXYGEN SATURATION: 96 % | HEART RATE: 79 BPM | HEIGHT: 64 IN | BODY MASS INDEX: 28.17 KG/M2 | SYSTOLIC BLOOD PRESSURE: 117 MMHG | TEMPERATURE: 98 F | WEIGHT: 165 LBS

## 2019-11-29 DIAGNOSIS — J01.00 ACUTE NON-RECURRENT MAXILLARY SINUSITIS: Primary | ICD-10-CM

## 2019-11-29 PROCEDURE — 99213 OFFICE O/P EST LOW 20 MIN: CPT

## 2019-11-29 PROCEDURE — 99203 OFFICE O/P NEW LOW 30 MIN: CPT

## 2019-11-29 RX ORDER — ECHINACEA PURPUREA EXTRACT 125 MG
2 TABLET ORAL AS NEEDED
Qty: 60 ML | Refills: 0 | Status: SHIPPED | OUTPATIENT
Start: 2019-11-29 | End: 2019-12-04

## 2019-11-29 RX ORDER — AMOXICILLIN AND CLAVULANATE POTASSIUM 875; 125 MG/1; MG/1
1 TABLET, FILM COATED ORAL 2 TIMES DAILY
Qty: 20 TABLET | Refills: 0 | Status: SHIPPED | OUTPATIENT
Start: 2019-11-29 | End: 2019-12-09

## 2019-11-29 RX ORDER — FLUTICASONE PROPIONATE 50 MCG
2 SPRAY, SUSPENSION (ML) NASAL DAILY
Qty: 16 G | Refills: 0 | Status: SHIPPED | OUTPATIENT
Start: 2019-11-29 | End: 2019-12-29

## 2019-11-29 NOTE — ED PROVIDER NOTES
Patient Seen in: [facility] In [address] [place] [address]    History   Patient presents with:  Sore Throat    Stated Complaint: sore throat    HPI      HISTORY OF PRESENT ILLNESS:Patient complains of URI symptoms for 4-5 days.   Complains of sinus con Colon Cancer Other         Close relative/No Family hx of Colon Cancer   • Breast Cancer Neg         No Family h/o Breast Cancer     Family history reviewed with patient/caregiver and is not pertinent to presenting problem.    Social History    Tobacco Use symptoms worsen      Medications Prescribed:  Current Discharge Medication List    START taking these medications    Amoxicillin-Pot Clavulanate 875-125 MG Oral Tab  Take 1 tablet by mouth 2 (two) times daily for 10 days.   Qty: 20 tablet Refills: 0    Darlys Lamp

## 2019-12-17 RX ORDER — ERGOCALCIFEROL 1.25 MG/1
CAPSULE ORAL
Qty: 12 CAPSULE | Refills: 0 | Status: SHIPPED | OUTPATIENT
Start: 2019-12-17

## 2019-12-18 NOTE — TELEPHONE ENCOUNTER
Review pended refill request as it does not fall under a protocol.     Requested Prescriptions     Pending Prescriptions Disp Refills   • ERGOCALCIFEROL 1.25 MG (02983 UT) Oral Cap [Pharmacy Med Name: VITAMIN D2 50,000IU (ERGO) CAP RX] 12 capsule 0     Sig:

## 2019-12-26 ENCOUNTER — OFFICE VISIT (OUTPATIENT)
Dept: FAMILY MEDICINE CLINIC | Facility: CLINIC | Age: 59
End: 2019-12-26
Payer: COMMERCIAL

## 2019-12-26 VITALS
HEIGHT: 64 IN | WEIGHT: 173.25 LBS | HEART RATE: 67 BPM | DIASTOLIC BLOOD PRESSURE: 76 MMHG | SYSTOLIC BLOOD PRESSURE: 136 MMHG | BODY MASS INDEX: 29.58 KG/M2

## 2019-12-26 DIAGNOSIS — M26.623 BILATERAL TEMPOROMANDIBULAR JOINT PAIN: ICD-10-CM

## 2019-12-26 DIAGNOSIS — E78.00 PURE HYPERCHOLESTEROLEMIA: ICD-10-CM

## 2019-12-26 DIAGNOSIS — D17.23 LIPOMA OF RIGHT LOWER EXTREMITY: Primary | ICD-10-CM

## 2019-12-26 PROCEDURE — 99214 OFFICE O/P EST MOD 30 MIN: CPT | Performed by: FAMILY MEDICINE

## 2019-12-26 RX ORDER — LEVOFLOXACIN 750 MG/1
750 TABLET ORAL DAILY
Qty: 5 TABLET | Refills: 0 | Status: SHIPPED | OUTPATIENT
Start: 2019-12-26 | End: 2020-08-17

## 2019-12-26 NOTE — PROGRESS NOTES
Blood pressure 136/76, pulse 67, height 5' 4\" (1.626 m), weight 173 lb 4 oz (78.6 kg). patient presents today complaining of persistent ear pain.   She was treated with antibiotics for sinus infection at the end of November took the Augmentin does no

## 2019-12-26 NOTE — PATIENT INSTRUCTIONS
Tratamiento dental para trastornos temporomandibulares (TTM)  Le hernández dado un diagnóstico de trastorno temporomandibular (TTM).  Ana Paula término describe un soraida de problemas relacionados con la articulación temporomandibular (EDUAR) y los músculos asociados a ? Debe limpiarse antes de ponerla en la boca y después de sacarla. Pregunte a mackay dentista u ortodoncista cómo limpiar la férula.   ? Debe guardarse en un estuche protector, fuera del alcance de los niños y de los Qaqortoq domésticos, para evitar que se ensu © 4314-6231 [address]. [address] [facility], 16[address] [facility]. Todos los derechos reservados. Esta información no pretende sustituir la atención médica profesional. Sólo mackay médico puede diagnosticar y tratar un problema de ernesto. · Relajantes musculares. Tratan el dolor miofascial, el cual afecta los tejidos blandos o los músculos que rodean la EDUAR.  Estos relajantes ayudan a aliviar la tensión muscular, con lo que se reduce la presión que los músculos de la mandíbula ejercen en la · Manipulación suave para reducir el dolor y restablecer la amplitud de [place]. El proveedor de atención médica [place] las ghazala para relajar los músculos y los ligamentos que rodean la articulación.   · Ejercicios para fortalecer los músculos de la ma Gino tiene un trastorno temporomandibular (TTM). Ana Paula término describe un soraida de problemas relacionados con la articulación temporomandibular (EDUAR) y los músculos asociados a yuly.  La EDUAR está situada en el punto de unión de los maxilares superior e inf El estrés es un factor esencial en los TTM: puede causar tensión muscular y rechinamiento de dientes, incluso alterar el sueño y reducir la capacidad del cuerpo para sanarse.  Aquí tiene algunos consejos para controlar el estrés:  · Aprenda técnicas para re La articulación temporomandibular (EDUAR) es dragan conexión de rótula esférica situada en el punto de unión del hueso maxilar superior y el inferior. Cuando la EDUAR y los músculos asociados se lesionan, hay que darles tiempo para sanar.  [facility] son Roxann Jacks Ciertas actividades (llamadas “desencadenantes”) fuerzan la EDUAR, lo cual empeora los síntomas. Los siguientes consejos pueden ayudarlo a evitar desencadenantes comunes y limitar la cantidad de esfuerzo en la articulación.   · [address] [place] [address] © 1515-0111 [address]. [address] [facility], 1612 [facility]. Todos los derechos reservados. Esta información no pretende sustituir la atención médica profesional. Sólo mackay médico puede diagnosticar y tratar un problema de ernesto. · Tensión muscular: Los músculos que rodean la EDUAR pueden tener espasmos (contracciones) y causar dolor.  El dolor referido se presenta en dragan parte del cuerpo distinta del punto de origen del problema; por ejemplo, un dolor en la nay o en los dientes pued [employer] partes de la mandíbula y la boca rachell dragan rogelio unidad. Por esto, la existencia de un problema en determinada sunny puede provocar síntomas en otra parte.  Algunos de los problemas dentales o de la mordida que están asociados a la EDUAR son:  ·

## 2020-01-02 ENCOUNTER — OFFICE VISIT (OUTPATIENT)
Dept: SURGERY | Facility: CLINIC | Age: 60
End: 2020-01-02
Payer: COMMERCIAL

## 2020-01-02 VITALS — WEIGHT: 173 LBS | BODY MASS INDEX: 29.53 KG/M2 | HEIGHT: 64 IN

## 2020-01-02 DIAGNOSIS — M79.89 MASS OF SOFT TISSUE OF LOWER EXTREMITY: ICD-10-CM

## 2020-01-02 DIAGNOSIS — M79.89 MASS OF SOFT TISSUE OF THIGH: Primary | ICD-10-CM

## 2020-01-02 PROCEDURE — 99243 OFF/OP CNSLTJ NEW/EST LOW 30: CPT | Performed by: SURGERY

## 2020-01-02 NOTE — H&P
Chief complaint: Patient presents with:  Lump: Pt presents today with masses, RLE & L Lower buttock. RLE has been present for 3-4 mos.& L lower buttock has been present for several years & is enlarging.       HPI: Birdie Turner  Has had a Left posterior thigh mass f name: Not on file      Number of children: Not on file      Years of education: Not on file      Highest education level: Not on file    Tobacco Use      Smoking status: Never Smoker      Smokeless tobacco: Never Used    Substance and Sexual Activity noted, no masses, no hernias, no ascites. Extremities: no edema, cyanosis, or clubbing. Large, 8 cm appendage on posterior mid Left thigh. 2 cm firm tender nodule anterior R thigh. Musculoskeletal: normal appearance, no deformities, normal function.  Da Yadav

## 2020-01-09 ENCOUNTER — OFFICE VISIT (OUTPATIENT)
Dept: SURGERY | Facility: CLINIC | Age: 60
End: 2020-01-09
Payer: COMMERCIAL

## 2020-01-09 VITALS
HEART RATE: 60 BPM | HEIGHT: 64 IN | WEIGHT: 173 LBS | BODY MASS INDEX: 29.53 KG/M2 | DIASTOLIC BLOOD PRESSURE: 62 MMHG | SYSTOLIC BLOOD PRESSURE: 110 MMHG

## 2020-01-09 DIAGNOSIS — M79.89 MASS OF SOFT TISSUE OF THIGH: Primary | ICD-10-CM

## 2020-01-09 PROCEDURE — 11406 EXC TR-EXT B9+MARG >4.0 CM: CPT | Performed by: SURGERY

## 2020-01-09 PROCEDURE — 12032 INTMD RPR S/A/T/EXT 2.6-7.5: CPT | Performed by: SURGERY

## 2020-01-09 NOTE — PROCEDURES
Procedure Note  The risks, benefits, alternatives and expected recovery were explained. The pt expressed understanding and wished to proceed with the procedure.       Preop:  7 cm soft tissue mass of the right posterior thigh  Postop:  Same  Procedure: Exc

## 2020-01-17 ENCOUNTER — OFFICE VISIT (OUTPATIENT)
Dept: SURGERY | Facility: CLINIC | Age: 60
End: 2020-01-17
Payer: COMMERCIAL

## 2020-01-17 VITALS — WEIGHT: 173 LBS | HEIGHT: 64 IN | BODY MASS INDEX: 29.53 KG/M2

## 2020-01-17 DIAGNOSIS — M79.89 MASS OF SOFT TISSUE OF THIGH: Primary | ICD-10-CM

## 2020-01-17 PROCEDURE — 12032 INTMD RPR S/A/T/EXT 2.6-7.5: CPT | Performed by: SURGERY

## 2020-01-17 PROCEDURE — 11404 EXC TR-EXT B9+MARG 3.1-4 CM: CPT | Performed by: SURGERY

## 2020-01-17 NOTE — PROCEDURES
Procedure Note  The risks, benefits, alternatives and expected recovery were explained. The pt expressed understanding and wished to proceed with the procedure.       Preop: 3.1 cm subfascial soft tissue mass of the right anterior thigh  Postop:  Same  Pro

## 2020-08-11 DIAGNOSIS — E78.00 PURE HYPERCHOLESTEROLEMIA: ICD-10-CM

## 2020-08-11 RX ORDER — ATORVASTATIN CALCIUM 40 MG/1
40 TABLET, FILM COATED ORAL NIGHTLY
Qty: 90 TABLET | Refills: 3 | Status: SHIPPED | OUTPATIENT
Start: 2020-08-11

## 2020-08-11 NOTE — TELEPHONE ENCOUNTER
Patient is requesting a refill. atorvastatin 40 MG Oral Tab 30 tablet 3 11/18/2019    Sig:   Take 1 tablet (40 mg total) by mouth nightly.      Route:   Oral     Order #: [id number]

## 2020-08-17 ENCOUNTER — OFFICE VISIT (OUTPATIENT)
Dept: OBGYN CLINIC | Facility: CLINIC | Age: 60
End: 2020-08-17
Payer: COMMERCIAL

## 2020-08-17 VITALS — DIASTOLIC BLOOD PRESSURE: 79 MMHG | BODY MASS INDEX: 31 KG/M2 | SYSTOLIC BLOOD PRESSURE: 129 MMHG | WEIGHT: 180 LBS

## 2020-08-17 DIAGNOSIS — R10.2 PELVIC PAIN: ICD-10-CM

## 2020-08-17 DIAGNOSIS — N81.0: ICD-10-CM

## 2020-08-17 DIAGNOSIS — N81.10 FEMALE CYSTOCELE: ICD-10-CM

## 2020-08-17 DIAGNOSIS — Z12.4 SCREENING FOR CERVICAL CANCER: Primary | ICD-10-CM

## 2020-08-17 PROCEDURE — 99204 OFFICE O/P NEW MOD 45 MIN: CPT | Performed by: OBSTETRICS & GYNECOLOGY

## 2020-08-17 PROCEDURE — 3078F DIAST BP <80 MM HG: CPT | Performed by: OBSTETRICS & GYNECOLOGY

## 2020-08-17 PROCEDURE — 3074F SYST BP LT 130 MM HG: CPT | Performed by: OBSTETRICS & GYNECOLOGY

## 2020-08-17 NOTE — PROGRESS NOTES
HPI:    Patient ID: Kiesha Fernández is a 63year old year old female. HPI  New patient  GYN problem visit  72-year-old  5 para 5 with history of 5 normal vaginal deliveries with babies ranging from 7 pounds to 8 pounds 11 ounces.   Menopausal since normal without lesion, cyst, mass, or tenderness. Vulva- normal.  Labia majora and minora without lesions. Vagina- normal, no lesions or discharge. Moist and well supported. Bladder-  nontender. No masses.   Small to moderate cystocele and urethrocele

## 2020-08-18 LAB — HPV I/H RISK 1 DNA SPEC QL NAA+PROBE: NEGATIVE

## 2020-08-21 ENCOUNTER — TELEPHONE (OUTPATIENT)
Dept: OBGYN CLINIC | Facility: CLINIC | Age: 60
End: 2020-08-21

## 2020-10-18 NOTE — TELEPHONE ENCOUNTER
Indian phone line  #[phone] used to translate phone call. Pt called and informed of results and recommendations.  Pt voices understanding.    ----- Message from Marilou Brunner, MD sent at 8/20/2020  9:27 AM CDT -----  Please notify by MyChart or Report and bedside handoff received from [facility]. Patient resting in bed. Patient did well managing pain throughout the night only getting 2 2mg doses of morphine. Good urine output 100ml per hour. Dr. Octavia Casey rounded on patient @ 0630 ordered IV fluids to be decreased to 30ml an hour and moved to left hand IV, RIJ to stay for another day capped, removal of NG tube, & d/c of CVP monitoring. He orders only ice chips for today. Report and bedside handoff with [facility].

## 2021-03-27 ENCOUNTER — TELEPHONE (OUTPATIENT)
Dept: FAMILY MEDICINE CLINIC | Facility: CLINIC | Age: 61
End: 2021-03-27

## 2021-06-01 ENCOUNTER — OFFICE VISIT (OUTPATIENT)
Dept: FAMILY MEDICINE CLINIC | Facility: CLINIC | Age: 61
End: 2021-06-01
Payer: COMMERCIAL

## 2021-06-01 VITALS
HEIGHT: 64 IN | WEIGHT: 174 LBS | SYSTOLIC BLOOD PRESSURE: 114 MMHG | BODY MASS INDEX: 29.71 KG/M2 | DIASTOLIC BLOOD PRESSURE: 73 MMHG | HEART RATE: 58 BPM

## 2021-06-01 DIAGNOSIS — Z00.00 ROUTINE GENERAL MEDICAL EXAMINATION AT A HEALTH CARE FACILITY: ICD-10-CM

## 2021-06-01 DIAGNOSIS — Z12.31 ENCOUNTER FOR SCREENING MAMMOGRAM FOR BREAST CANCER: Primary | ICD-10-CM

## 2021-06-01 DIAGNOSIS — E55.9 VITAMIN D DEFICIENCY: ICD-10-CM

## 2021-06-01 PROCEDURE — 90715 TDAP VACCINE 7 YRS/> IM: CPT | Performed by: PHYSICIAN ASSISTANT

## 2021-06-01 PROCEDURE — 3078F DIAST BP <80 MM HG: CPT | Performed by: PHYSICIAN ASSISTANT

## 2021-06-01 PROCEDURE — 90472 IMMUNIZATION ADMIN EACH ADD: CPT | Performed by: PHYSICIAN ASSISTANT

## 2021-06-01 PROCEDURE — 3008F BODY MASS INDEX DOCD: CPT | Performed by: PHYSICIAN ASSISTANT

## 2021-06-01 PROCEDURE — 90471 IMMUNIZATION ADMIN: CPT | Performed by: PHYSICIAN ASSISTANT

## 2021-06-01 PROCEDURE — 90750 HZV VACC RECOMBINANT IM: CPT | Performed by: PHYSICIAN ASSISTANT

## 2021-06-01 PROCEDURE — 3074F SYST BP LT 130 MM HG: CPT | Performed by: PHYSICIAN ASSISTANT

## 2021-06-01 PROCEDURE — 99396 PREV VISIT EST AGE 40-64: CPT | Performed by: PHYSICIAN ASSISTANT

## 2021-06-01 NOTE — PATIENT INSTRUCTIONS
Pautas de prevención para mujeres de entre 48 y 63 años  [address] detección y las vacunas son importantes para el manejo de mackay ernesto.  Las pruebas mencionadas se realizan para detectar posibles trastornos o enfermedades en personas que no tienen osman mujeres de margie soraida de edad que tengan un riesgo promedio Debería hacerse dragan mamografía al  14 PUKJ. A partir de los 88 YUVG, debería hacerse [place] [employer] años u optar por seguir haciéndose dragan al Oleg.    Todas las mujeres deberían c Todas las [employer] con un riesgo más alto; dragan vez para las [address] 1944 y 1964 En los exámenes de rutina   Nivel alto de colesterol o de triglicéridos Todas las mujeres de margie soraida de edad que tengan riesgo de tener dragan enfermedad de las arterias c proveedor de [employer]. Hepatitis B Mujeres con mayor riesgo de infección 2 o 3 dosis (según la vacuna).  La segunda dosis debería administrarse 1 mes después de la primera dosis; si hay dragan tercera dosis, debería administrarse al [facility] el herpes zóster (Zostavax) podría administrarse a adultos sanos mayores de 60 años.  Suele administrarse en dragan rogelio dosis.       Asesoramiento Para quiénes Frecuencia   Pruebas sobre mutación de los genes BRCA para conocer el riesgo de tener cáncer de ova

## 2021-06-01 NOTE — PROGRESS NOTES
HPI:   Raina Crenshaw is a 62year old female who presents for an Annual Health Visit. Patient is doing fine at this time. Patient denies of chest pain, SOB, N/V/C/D, fever, dizziness, syncope, abdominal pain. There are no other concerns today.       A History    Social History Narrative      The patient does not use an assistive device. .        The patient does not live in a home with stairs. REVIEW OF SYSTEMS:     Review of Systems   Constitutional: Negative. HENT: Negative. Eyes: Negative. are normal. There is no distension. Palpations: Abdomen is soft. Tenderness: There is no abdominal tenderness. There is no right CVA tenderness or left CVA tenderness.    Genitourinary:     Vagina: Normal.   Musculoskeletal:         General: [place] trastornos o [employer]	[place] que no tienen ningún síntoma.  El objetivo es encontrar dragan enfermedad de manera temprana para poder hacer cambios en el estilo de vandana y para que la puedan controlar de cerca para reducir los riesgos de sufrir dragan enfe haciéndose dragan al [place]. Todas las mujeres deberían conocer la apariencia y cómo se sienten las mamas al palparlas y saber los posibles beneficios y riesgos de hacerse mamografías exploratorias.     Cáncer del kan uterino Todas las mujeres de [place] tengan riesgo de tener dargan enfermedad de las arterias coronarias  Al menos cada 5 años. Hable con mackay proveedor de [employer] el riesgo en mackay damari. [address] mujeres Al menos dragan vez yancy mackay vandana.  Pamalee I[place] al año si está en el soraida de r tercera dosis, debería administrarse al menos 2 meses después de la segunda dosis y al menos 4 meses después de la primera dosis. Hable con mackay proveedor de [employer]. Haemophilus influenzae tipo B Mujeres con mayor riesgo de infección 1 o 3 dosis. los genes BRCA para conocer el riesgo de tener cáncer de ovario y cáncer de mama  Mujeres con mayor riesgo de tener mutación de los genes Cuando se conoce mackay riesgo. Hable con mackay proveedor de [facility].    Cáncer de mama y [address] [place] [address] Pkwy con

## 2021-06-19 ENCOUNTER — LAB ENCOUNTER (OUTPATIENT)
Dept: LAB | Age: 61
End: 2021-06-19
Attending: PHYSICIAN ASSISTANT
Payer: COMMERCIAL

## 2021-06-19 DIAGNOSIS — Z00.00 ROUTINE GENERAL MEDICAL EXAMINATION AT A HEALTH CARE FACILITY: ICD-10-CM

## 2021-06-19 DIAGNOSIS — E55.9 VITAMIN D DEFICIENCY: ICD-10-CM

## 2021-06-19 PROCEDURE — 82306 VITAMIN D 25 HYDROXY: CPT

## 2021-06-19 PROCEDURE — 85025 COMPLETE CBC W/AUTO DIFF WBC: CPT

## 2021-06-19 PROCEDURE — 84443 ASSAY THYROID STIM HORMONE: CPT

## 2021-06-19 PROCEDURE — 80061 LIPID PANEL: CPT

## 2021-06-19 PROCEDURE — 80053 COMPREHEN METABOLIC PANEL: CPT

## 2021-06-19 PROCEDURE — 83036 HEMOGLOBIN GLYCOSYLATED A1C: CPT

## 2021-06-19 PROCEDURE — 36415 COLL VENOUS BLD VENIPUNCTURE: CPT

## 2021-06-21 ENCOUNTER — TELEPHONE (OUTPATIENT)
Dept: FAMILY MEDICINE CLINIC | Facility: CLINIC | Age: 61
End: 2021-06-21

## 2021-06-21 NOTE — TELEPHONE ENCOUNTER
----- Message From: Brijesh Chris PA-C  Sent: 6/20/2021 10:05 AM CDT ----    1. Patient is not diabetic. 2. Liver function, kidney function, cholesterol panel and thyroid are normal.  3. CBC shows no anemia.

## 2021-06-25 ENCOUNTER — OFFICE VISIT (OUTPATIENT)
Dept: FAMILY MEDICINE CLINIC | Facility: CLINIC | Age: 61
End: 2021-06-25
Payer: COMMERCIAL

## 2021-06-25 VITALS
BODY MASS INDEX: 29.29 KG/M2 | SYSTOLIC BLOOD PRESSURE: 119 MMHG | HEART RATE: 55 BPM | WEIGHT: 171.56 LBS | HEIGHT: 64 IN | DIASTOLIC BLOOD PRESSURE: 74 MMHG

## 2021-06-25 DIAGNOSIS — I87.9 VEIN DISORDER: ICD-10-CM

## 2021-06-25 DIAGNOSIS — E78.00 PURE HYPERCHOLESTEROLEMIA: Primary | ICD-10-CM

## 2021-06-25 PROCEDURE — 3008F BODY MASS INDEX DOCD: CPT | Performed by: FAMILY MEDICINE

## 2021-06-25 PROCEDURE — 3074F SYST BP LT 130 MM HG: CPT | Performed by: FAMILY MEDICINE

## 2021-06-25 PROCEDURE — 3078F DIAST BP <80 MM HG: CPT | Performed by: FAMILY MEDICINE

## 2021-06-25 PROCEDURE — 99213 OFFICE O/P EST LOW 20 MIN: CPT | Performed by: FAMILY MEDICINE

## 2021-06-25 NOTE — PROGRESS NOTES
Blood pressure 119/74, pulse 55, height 5' 4\" (1.626 m), weight 171 lb 9 oz (77.8 kg). Patient presents today following up for hyperlipidemia. Complaining of varicose veins nonpainful. No chest pain no dyspnea.   Interested in stopping statin medicati

## 2021-06-28 ENCOUNTER — TELEPHONE (OUTPATIENT)
Dept: FAMILY MEDICINE CLINIC | Facility: CLINIC | Age: 61
End: 2021-06-28

## 2021-07-13 ENCOUNTER — HOSPITAL ENCOUNTER (OUTPATIENT)
Dept: MAMMOGRAPHY | Age: 61
Discharge: HOME | End: 2021-07-13
Attending: PHYSICIAN ASSISTANT
Payer: COMMERCIAL

## 2021-07-13 DIAGNOSIS — Z12.31 ENCOUNTER FOR SCREENING MAMMOGRAM FOR BREAST CANCER: ICD-10-CM

## 2021-07-13 PROCEDURE — 77063 BREAST TOMOSYNTHESIS BI: CPT | Performed by: PHYSICIAN ASSISTANT

## 2021-07-13 PROCEDURE — 77067 SCR MAMMO BI INCL CAD: CPT | Performed by: PHYSICIAN ASSISTANT

## 2021-07-14 ENCOUNTER — TELEPHONE (OUTPATIENT)
Dept: FAMILY MEDICINE CLINIC | Facility: CLINIC | Age: 61
End: 2021-07-14

## 2021-07-14 NOTE — TELEPHONE ENCOUNTER
----- Message From: Melva Combs PA-C  Sent: 7/14/2021 8:42 AM CDT ---      Mammogram is stable. Repeat in 1 year.

## 2022-06-15 ENCOUNTER — NURSE TRIAGE (OUTPATIENT)
Dept: FAMILY MEDICINE CLINIC | Facility: CLINIC | Age: 62
End: 2022-06-15

## 2022-06-20 ENCOUNTER — OFFICE VISIT (OUTPATIENT)
Dept: FAMILY MEDICINE CLINIC | Facility: CLINIC | Age: 62
End: 2022-06-20
Payer: COMMERCIAL

## 2022-06-20 VITALS
HEART RATE: 56 BPM | DIASTOLIC BLOOD PRESSURE: 70 MMHG | WEIGHT: 172 LBS | SYSTOLIC BLOOD PRESSURE: 120 MMHG | HEIGHT: 64 IN | BODY MASS INDEX: 29.37 KG/M2

## 2022-06-20 DIAGNOSIS — R09.89 GLOBUS SENSATION: ICD-10-CM

## 2022-06-20 DIAGNOSIS — E78.00 PURE HYPERCHOLESTEROLEMIA: Primary | ICD-10-CM

## 2022-06-20 DIAGNOSIS — E55.9 VITAMIN D DEFICIENCY: ICD-10-CM

## 2022-06-20 PROCEDURE — 3074F SYST BP LT 130 MM HG: CPT | Performed by: FAMILY MEDICINE

## 2022-06-20 PROCEDURE — 99213 OFFICE O/P EST LOW 20 MIN: CPT | Performed by: FAMILY MEDICINE

## 2022-06-20 PROCEDURE — 3008F BODY MASS INDEX DOCD: CPT | Performed by: FAMILY MEDICINE

## 2022-06-20 PROCEDURE — 3078F DIAST BP <80 MM HG: CPT | Performed by: FAMILY MEDICINE

## 2022-06-20 RX ORDER — PENICILLIN V POTASSIUM 500 MG/1
500 TABLET ORAL 4 TIMES DAILY
Qty: 40 TABLET | Refills: 0 | Status: SHIPPED | OUTPATIENT
Start: 2022-06-20

## 2022-06-20 RX ORDER — IBUPROFEN 600 MG/1
600 TABLET ORAL EVERY 8 HOURS PRN
Qty: 90 TABLET | Refills: 0 | Status: SHIPPED | OUTPATIENT
Start: 2022-06-20 | End: 2022-06-30

## 2022-06-20 NOTE — PROGRESS NOTES
Blood pressure 120/70, pulse 56, height 5' 4\" (1.626 m), weight 172 lb (78 kg). Complaining of discomfort when swallowing also pain in the right side of face has history of TMJ wears a nightguard. She reports she does grind her teeth at night. She reports that she also notices pain up underneath her jaw.     She complains of a globus sensation    Objective thyroid with no masses    Some tenderness noted submandibular gland    Tenderness noted right TMJ    Assessment sialadenitis and TMJ    Plan penicillin and thyroid ultrasound for globus sensation    TMJ handouts given in Albanian    Ibuprofen as needed    Follow-up if no improvement

## 2022-06-21 ENCOUNTER — LAB ENCOUNTER (OUTPATIENT)
Dept: LAB | Age: 62
End: 2022-06-21
Attending: FAMILY MEDICINE
Payer: COMMERCIAL

## 2022-06-21 ENCOUNTER — PATIENT MESSAGE (OUTPATIENT)
Dept: FAMILY MEDICINE CLINIC | Facility: CLINIC | Age: 62
End: 2022-06-21

## 2022-06-21 DIAGNOSIS — E78.00 PURE HYPERCHOLESTEROLEMIA: ICD-10-CM

## 2022-06-21 DIAGNOSIS — E55.9 VITAMIN D DEFICIENCY: ICD-10-CM

## 2022-06-21 LAB
ALBUMIN SERPL-MCNC: 3.7 G/DL (ref 3.4–5)
ALBUMIN/GLOB SERPL: 1.2 {RATIO} (ref 1–2)
ALP LIVER SERPL-CCNC: 93 U/L
ALT SERPL-CCNC: 16 U/L
ANION GAP SERPL CALC-SCNC: 8 MMOL/L (ref 0–18)
AST SERPL-CCNC: 14 U/L (ref 15–37)
BILIRUB SERPL-MCNC: 0.5 MG/DL (ref 0.1–2)
BUN BLD-MCNC: 15 MG/DL (ref 7–18)
BUN/CREAT SERPL: 17 (ref 10–20)
CALCIUM BLD-MCNC: 9.1 MG/DL (ref 8.5–10.1)
CHLORIDE SERPL-SCNC: 109 MMOL/L (ref 98–112)
CHOLEST SERPL-MCNC: 186 MG/DL (ref ?–200)
CO2 SERPL-SCNC: 26 MMOL/L (ref 21–32)
CREAT BLD-MCNC: 0.88 MG/DL
FASTING PATIENT LIPID ANSWER: YES
FASTING STATUS PATIENT QL REPORTED: YES
GLOBULIN PLAS-MCNC: 3.2 G/DL (ref 2.8–4.4)
GLUCOSE BLD-MCNC: 85 MG/DL (ref 70–99)
HDLC SERPL-MCNC: 55 MG/DL (ref 40–59)
LDLC SERPL CALC-MCNC: 116 MG/DL (ref ?–100)
NONHDLC SERPL-MCNC: 131 MG/DL (ref ?–130)
OSMOLALITY SERPL CALC.SUM OF ELEC: 296 MOSM/KG (ref 275–295)
POTASSIUM SERPL-SCNC: 4.4 MMOL/L (ref 3.5–5.1)
PROT SERPL-MCNC: 6.9 G/DL (ref 6.4–8.2)
SODIUM SERPL-SCNC: 143 MMOL/L (ref 136–145)
TRIGL SERPL-MCNC: 84 MG/DL (ref 30–149)
TSI SER-ACNC: 2.72 MIU/ML (ref 0.36–3.74)
VIT D+METAB SERPL-MCNC: 32.3 NG/ML (ref 30–100)
VLDLC SERPL CALC-MCNC: 15 MG/DL (ref 0–30)

## 2022-06-21 PROCEDURE — 82306 VITAMIN D 25 HYDROXY: CPT

## 2022-06-21 PROCEDURE — 80053 COMPREHEN METABOLIC PANEL: CPT

## 2022-06-21 PROCEDURE — 84443 ASSAY THYROID STIM HORMONE: CPT

## 2022-06-21 PROCEDURE — 36415 COLL VENOUS BLD VENIPUNCTURE: CPT

## 2022-06-21 PROCEDURE — 80061 LIPID PANEL: CPT

## 2022-06-24 NOTE — TELEPHONE ENCOUNTER
Patient is requesting a call back to discuss results.  Patient no longer has access to [employer]

## 2022-06-24 NOTE — TELEPHONE ENCOUNTER
DR Decker=please translate in English and we will contact the patient. Per Kristina's note below, patient does not have any access to her [employer] now and requesting a call back to discuss her test results,saqib Clarke, DO  [employer] 3 days ago     Esta elevado cholesterol. Que bajes peso no hay mas cambios. This [employer] message has not been read.

## 2022-06-26 ENCOUNTER — HOSPITAL ENCOUNTER (OUTPATIENT)
Dept: ULTRASOUND IMAGING | Age: 62
End: 2022-06-26
Attending: FAMILY MEDICINE
Payer: COMMERCIAL

## 2022-06-26 ENCOUNTER — HOSPITAL ENCOUNTER (OUTPATIENT)
Dept: ULTRASOUND IMAGING | Age: 62
Discharge: HOME | End: 2022-06-26
Attending: FAMILY MEDICINE
Payer: COMMERCIAL

## 2022-06-26 DIAGNOSIS — R09.89 GLOBUS SENSATION: ICD-10-CM

## 2022-06-26 PROCEDURE — 76536 US EXAM OF HEAD AND NECK: CPT | Performed by: FAMILY MEDICINE

## 2022-06-28 NOTE — TELEPHONE ENCOUNTER
Using language line : Obadiah Denver ID number [id number]    Patient notified, verbalized understanding.

## 2022-07-01 ENCOUNTER — OFFICE VISIT (OUTPATIENT)
Dept: OTOLARYNGOLOGY | Facility: CLINIC | Age: 62
End: 2022-07-01
Payer: COMMERCIAL

## 2022-07-01 VITALS — TEMPERATURE: 98 F

## 2022-07-01 DIAGNOSIS — H92.01 RIGHT EAR PAIN: Primary | ICD-10-CM

## 2022-07-01 RX ORDER — CYCLOBENZAPRINE HCL 5 MG
5 TABLET ORAL NIGHTLY
Qty: 30 TABLET | Refills: 1 | Status: SHIPPED | OUTPATIENT
Start: 2022-07-01

## 2022-07-01 RX ORDER — IBUPROFEN 200 MG
200 TABLET ORAL EVERY 6 HOURS PRN
COMMUNITY

## 2022-07-01 RX ORDER — CELECOXIB 200 MG/1
200 CAPSULE ORAL DAILY PRN
Qty: 30 CAPSULE | Refills: 0 | Status: SHIPPED | OUTPATIENT
Start: 2022-07-01 | End: 2022-07-31

## 2022-07-21 ENCOUNTER — NURSE TRIAGE (OUTPATIENT)
Dept: FAMILY MEDICINE CLINIC | Facility: CLINIC | Age: 62
End: 2022-07-21

## 2022-07-21 ENCOUNTER — APPOINTMENT (OUTPATIENT)
Dept: GENERAL RADIOLOGY | Age: 62
End: 2022-07-21
Attending: NURSE PRACTITIONER
Payer: COMMERCIAL

## 2022-07-21 ENCOUNTER — HOSPITAL ENCOUNTER (OUTPATIENT)
Age: 62
Discharge: HOME | End: 2022-07-21
Payer: COMMERCIAL

## 2022-07-21 ENCOUNTER — APPOINTMENT (OUTPATIENT)
Dept: CT IMAGING | Age: 62
End: 2022-07-21
Attending: NURSE PRACTITIONER
Payer: COMMERCIAL

## 2022-07-21 VITALS
WEIGHT: 169 LBS | SYSTOLIC BLOOD PRESSURE: 144 MMHG | TEMPERATURE: 98 F | BODY MASS INDEX: 28.85 KG/M2 | DIASTOLIC BLOOD PRESSURE: 63 MMHG | HEART RATE: 63 BPM | HEIGHT: 64 IN | OXYGEN SATURATION: 96 % | RESPIRATION RATE: 18 BRPM

## 2022-07-21 DIAGNOSIS — R06.02 SHORTNESS OF BREATH: Primary | ICD-10-CM

## 2022-07-21 LAB
#MXD IC: 0.5 X10ˆ3/UL (ref 0.1–1)
DDIMER WHOLE BLOOD: 540 NG/ML DDU (ref ?–400)
HCT VFR BLD AUTO: 38.7 %
HGB BLD-MCNC: 12.9 G/DL
LYMPHOCYTES # BLD AUTO: 1.8 X10ˆ3/UL (ref 1–4)
LYMPHOCYTES NFR BLD AUTO: 29.4 %
MCH RBC QN AUTO: 29.3 PG (ref 26–34)
MCHC RBC AUTO-ENTMCNC: 33.3 G/DL (ref 31–37)
MCV RBC AUTO: 87.8 FL (ref 80–100)
MIXED CELL %: 7.6 %
NEUTROPHILS # BLD AUTO: 3.9 X10ˆ3/UL (ref 1.5–7.7)
NEUTROPHILS NFR BLD AUTO: 63 %
PLATELET # BLD AUTO: 185 X10ˆ3/UL (ref 150–450)
RBC # BLD AUTO: 4.41 X10ˆ6/UL
SARS-COV-2 RNA RESP QL NAA+PROBE: NOT DETECTED
WBC # BLD AUTO: 6.2 X10ˆ3/UL (ref 4–11)

## 2022-07-21 PROCEDURE — 85025 COMPLETE CBC W/AUTO DIFF WBC: CPT | Performed by: NURSE PRACTITIONER

## 2022-07-21 PROCEDURE — U0002 COVID-19 LAB TEST NON-CDC: HCPCS | Performed by: NURSE PRACTITIONER

## 2022-07-21 PROCEDURE — 71046 X-RAY EXAM CHEST 2 VIEWS: CPT | Performed by: NURSE PRACTITIONER

## 2022-07-21 PROCEDURE — 36415 COLL VENOUS BLD VENIPUNCTURE: CPT | Performed by: NURSE PRACTITIONER

## 2022-07-21 PROCEDURE — 71260 CT THORAX DX C+: CPT | Performed by: NURSE PRACTITIONER

## 2022-07-21 PROCEDURE — 93000 ELECTROCARDIOGRAM COMPLETE: CPT | Performed by: NURSE PRACTITIONER

## 2022-07-21 PROCEDURE — 99204 OFFICE O/P NEW MOD 45 MIN: CPT | Performed by: NURSE PRACTITIONER

## 2022-07-21 NOTE — ED INITIAL ASSESSMENT (HPI)
Pt presents to the IC A&Ox4 with c/o SOB x 1 week. Pt speaking in complete sentences in no apparent distress. Denies cough, fever.

## 2022-07-21 NOTE — ED QUICK NOTES
Pt given instructions/directions to [place] [facility] care for CT. She verbalized understanding.

## 2022-07-21 NOTE — ED QUICK NOTES
Chem 8    Na 140  K 4.1  Cl 104  ica 1.18  tco2 26  Glu 86  Bun 16  Creat 0.9  Hematocrit 41    Trop 0.0

## 2022-07-22 ENCOUNTER — OFFICE VISIT (OUTPATIENT)
Dept: FAMILY MEDICINE CLINIC | Facility: CLINIC | Age: 62
End: 2022-07-22
Payer: COMMERCIAL

## 2022-07-22 ENCOUNTER — LAB ENCOUNTER (OUTPATIENT)
Dept: LAB | Age: 62
End: 2022-07-22
Attending: PHYSICIAN ASSISTANT
Payer: COMMERCIAL

## 2022-07-22 VITALS
HEIGHT: 64 IN | BODY MASS INDEX: 29.88 KG/M2 | SYSTOLIC BLOOD PRESSURE: 117 MMHG | DIASTOLIC BLOOD PRESSURE: 72 MMHG | HEART RATE: 68 BPM | WEIGHT: 175 LBS

## 2022-07-22 DIAGNOSIS — R06.02 SOB (SHORTNESS OF BREATH): Primary | ICD-10-CM

## 2022-07-22 DIAGNOSIS — R06.02 SOB (SHORTNESS OF BREATH): ICD-10-CM

## 2022-07-22 LAB — NT-PROBNP SERPL-MCNC: 58 PG/ML (ref ?–125)

## 2022-07-22 PROCEDURE — 3074F SYST BP LT 130 MM HG: CPT | Performed by: PHYSICIAN ASSISTANT

## 2022-07-22 PROCEDURE — 3008F BODY MASS INDEX DOCD: CPT | Performed by: PHYSICIAN ASSISTANT

## 2022-07-22 PROCEDURE — 36415 COLL VENOUS BLD VENIPUNCTURE: CPT

## 2022-07-22 PROCEDURE — 99213 OFFICE O/P EST LOW 20 MIN: CPT | Performed by: PHYSICIAN ASSISTANT

## 2022-07-22 PROCEDURE — 3078F DIAST BP <80 MM HG: CPT | Performed by: PHYSICIAN ASSISTANT

## 2022-07-22 PROCEDURE — 83880 ASSAY OF NATRIURETIC PEPTIDE: CPT

## 2022-07-22 RX ORDER — ALBUTEROL SULFATE 90 UG/1
2 AEROSOL, METERED RESPIRATORY (INHALATION) EVERY 4 HOURS PRN
Qty: 18 G | Refills: 0 | Status: SHIPPED | OUTPATIENT
Start: 2022-07-22

## 2022-07-22 NOTE — TELEPHONE ENCOUNTER
Seen in [facility] yesterday:     Follow-Ups     1 Schedule an appointment with Zeferino Awad DO (Family Medicine) in 2 days (7/23/2022)  2 Follow up with Nicolas Keith DO (Cardiovascular Diseases);  Resource for cardiologist

## 2022-07-25 LAB
BUN BLD-MCNC: 16 MG/DL (ref 7–18)
CHLORIDE BLD-SCNC: 104 MMOL/L (ref 98–112)
CO2 BLD-SCNC: 26 MMOL/L (ref 21–32)
CREAT BLD-MCNC: 0.9 MG/DL
GLUCOSE BLD-MCNC: 86 MG/DL (ref 70–99)
HCT VFR BLD CALC: 41 %
ISTAT IONIZED CALCIUM FOR CHEM 8: 1.18 MMOL/L (ref 1.12–1.32)
POTASSIUM BLD-SCNC: 4.1 MMOL/L (ref 3.6–5.1)
SODIUM BLD-SCNC: 140 MMOL/L (ref 136–145)
TROPONIN I BLD-MCNC: <0.02 NG/ML

## 2022-07-25 RX ORDER — ALBUTEROL SULFATE 90 UG/1
2 AEROSOL, METERED RESPIRATORY (INHALATION) EVERY 4 HOURS PRN
Qty: 18 G | Refills: 5 | Status: SHIPPED | OUTPATIENT
Start: 2022-07-25 | End: 2023-08-22 | Stop reason: ALTCHOICE

## 2022-07-25 NOTE — TELEPHONE ENCOUNTER
[address] is calling to advise PCP the following medication prescribed is not covered:    Albuterol Ventolin    Please prescribe the following for coverage:    ProAir or Lexie    Call back [phone] if any questions.

## 2022-07-26 ENCOUNTER — OFFICE VISIT (OUTPATIENT)
Dept: OTOLARYNGOLOGY | Facility: CLINIC | Age: 62
End: 2022-07-26
Payer: COMMERCIAL

## 2022-07-26 ENCOUNTER — TELEPHONE (OUTPATIENT)
Dept: OTOLARYNGOLOGY | Facility: CLINIC | Age: 62
End: 2022-07-26

## 2022-07-26 VITALS — TEMPERATURE: 96 F | BODY MASS INDEX: 29.88 KG/M2 | HEIGHT: 64 IN | WEIGHT: 175 LBS

## 2022-07-26 DIAGNOSIS — H92.01 RIGHT EAR PAIN: Primary | ICD-10-CM

## 2022-07-26 PROCEDURE — 3008F BODY MASS INDEX DOCD: CPT | Performed by: OTOLARYNGOLOGY

## 2022-07-26 PROCEDURE — 99213 OFFICE O/P EST LOW 20 MIN: CPT | Performed by: OTOLARYNGOLOGY

## 2022-07-26 RX ORDER — CELECOXIB 200 MG/1
200 CAPSULE ORAL DAILY PRN
Qty: 30 CAPSULE | Refills: 0 | Status: SHIPPED | OUTPATIENT
Start: 2022-07-26 | End: 2022-07-27

## 2022-07-26 RX ORDER — CYCLOBENZAPRINE HCL 5 MG
5 TABLET ORAL NIGHTLY
Qty: 30 TABLET | Refills: 1 | Status: SHIPPED | OUTPATIENT
Start: 2022-07-26

## 2022-07-27 RX ORDER — CELECOXIB 200 MG/1
200 CAPSULE ORAL DAILY PRN
Qty: 90 CAPSULE | Refills: 0 | Status: SHIPPED | OUTPATIENT
Start: 2022-07-27 | End: 2022-08-26

## 2023-08-22 ENCOUNTER — OFFICE VISIT (OUTPATIENT)
Dept: FAMILY MEDICINE CLINIC | Facility: CLINIC | Age: 63
End: 2023-08-22

## 2023-08-22 VITALS
DIASTOLIC BLOOD PRESSURE: 84 MMHG | BODY MASS INDEX: 30.73 KG/M2 | WEIGHT: 180 LBS | HEIGHT: 64 IN | HEART RATE: 59 BPM | SYSTOLIC BLOOD PRESSURE: 132 MMHG

## 2023-08-22 DIAGNOSIS — Z01.419 ENCOUNTER FOR ROUTINE GYNECOLOGICAL EXAMINATION WITH PAPANICOLAOU SMEAR OF CERVIX: ICD-10-CM

## 2023-08-22 DIAGNOSIS — Z00.00 ROUTINE GENERAL MEDICAL EXAMINATION AT A HEALTH CARE FACILITY: Primary | ICD-10-CM

## 2023-08-22 DIAGNOSIS — Z12.31 ENCOUNTER FOR SCREENING MAMMOGRAM FOR BREAST CANCER: ICD-10-CM

## 2023-08-22 DIAGNOSIS — E66.09 CLASS 1 OBESITY DUE TO EXCESS CALORIES WITHOUT SERIOUS COMORBIDITY WITH BODY MASS INDEX (BMI) OF 30.0 TO 30.9 IN ADULT: ICD-10-CM

## 2023-08-22 DIAGNOSIS — Z12.11 SCREENING FOR MALIGNANT NEOPLASM OF COLON: ICD-10-CM

## 2023-08-22 PROBLEM — E66.811 CLASS 1 OBESITY DUE TO EXCESS CALORIES WITHOUT SERIOUS COMORBIDITY WITH BODY MASS INDEX (BMI) OF 30.0 TO 30.9 IN ADULT: Status: ACTIVE | Noted: 2023-08-22

## 2023-08-22 PROBLEM — H66.011 ACUTE SUPPURATIVE OTITIS MEDIA OF RIGHT EAR WITH SPONTANEOUS RUPTURE OF TYMPANIC MEMBRANE: Status: RESOLVED | Noted: 2017-01-25 | Resolved: 2023-08-22

## 2023-08-22 PROBLEM — R10.2 PELVIC PAIN: Status: RESOLVED | Noted: 2020-08-17 | Resolved: 2023-08-22

## 2023-08-22 PROCEDURE — 3079F DIAST BP 80-89 MM HG: CPT | Performed by: PHYSICIAN ASSISTANT

## 2023-08-22 PROCEDURE — 3075F SYST BP GE 130 - 139MM HG: CPT | Performed by: PHYSICIAN ASSISTANT

## 2023-08-22 PROCEDURE — 3008F BODY MASS INDEX DOCD: CPT | Performed by: PHYSICIAN ASSISTANT

## 2023-08-22 PROCEDURE — 99396 PREV VISIT EST AGE 40-64: CPT | Performed by: PHYSICIAN ASSISTANT

## 2023-08-22 PROCEDURE — 90750 HZV VACC RECOMBINANT IM: CPT | Performed by: PHYSICIAN ASSISTANT

## 2023-08-22 PROCEDURE — 90471 IMMUNIZATION ADMIN: CPT | Performed by: PHYSICIAN ASSISTANT

## 2023-08-28 LAB — HPV I/H RISK 1 DNA SPEC QL NAA+PROBE: NEGATIVE

## 2023-11-20 ENCOUNTER — TELEPHONE (OUTPATIENT)
Facility: CLINIC | Age: 63
End: 2023-11-20

## 2023-11-20 NOTE — TELEPHONE ENCOUNTER
----- Message from Darron Councilman, RN sent at 2019 12:18 PM CST -----  Regardin year colonoscopy recall   5 Year colonoscopy recall placed in system per Dr. Onel Winchester . Colonoscopy done on 19  . Next due on  24 . Snapshot updated.

## 2023-11-22 ENCOUNTER — LAB ENCOUNTER (OUTPATIENT)
Dept: LAB | Age: 63
End: 2023-11-22
Attending: PHYSICIAN ASSISTANT
Payer: COMMERCIAL

## 2023-11-22 DIAGNOSIS — E66.09 CLASS 1 OBESITY DUE TO EXCESS CALORIES WITHOUT SERIOUS COMORBIDITY WITH BODY MASS INDEX (BMI) OF 30.0 TO 30.9 IN ADULT: ICD-10-CM

## 2023-11-22 DIAGNOSIS — Z00.00 ROUTINE GENERAL MEDICAL EXAMINATION AT A HEALTH CARE FACILITY: ICD-10-CM

## 2023-11-22 LAB
ALBUMIN SERPL-MCNC: 4.2 G/DL (ref 3.2–4.8)
ALBUMIN/GLOB SERPL: 1.6 {RATIO} (ref 1–2)
ALP LIVER SERPL-CCNC: 90 U/L
ALT SERPL-CCNC: 9 U/L
ANION GAP SERPL CALC-SCNC: 6 MMOL/L (ref 0–18)
AST SERPL-CCNC: 18 U/L (ref ?–34)
BASOPHILS # BLD AUTO: 0.05 X10(3) UL (ref 0–0.2)
BASOPHILS NFR BLD AUTO: 0.9 %
BILIRUB SERPL-MCNC: 0.7 MG/DL (ref 0.2–1.1)
BUN BLD-MCNC: 15 MG/DL (ref 9–23)
BUN/CREAT SERPL: 17 (ref 10–20)
CALCIUM BLD-MCNC: 9.2 MG/DL (ref 8.7–10.4)
CHLORIDE SERPL-SCNC: 109 MMOL/L (ref 98–112)
CHOLEST SERPL-MCNC: 207 MG/DL (ref ?–200)
CO2 SERPL-SCNC: 27 MMOL/L (ref 21–32)
CREAT BLD-MCNC: 0.88 MG/DL
DEPRECATED RDW RBC AUTO: 41.3 FL (ref 35.1–46.3)
EGFRCR SERPLBLD CKD-EPI 2021: 75 ML/MIN/1.73M2 (ref 60–?)
EOSINOPHIL # BLD AUTO: 0.18 X10(3) UL (ref 0–0.7)
EOSINOPHIL NFR BLD AUTO: 3.4 %
ERYTHROCYTE [DISTWIDTH] IN BLOOD BY AUTOMATED COUNT: 12.7 % (ref 11–15)
EST. AVERAGE GLUCOSE BLD GHB EST-MCNC: 105 MG/DL (ref 68–126)
FASTING PATIENT LIPID ANSWER: YES
FASTING STATUS PATIENT QL REPORTED: YES
GLOBULIN PLAS-MCNC: 2.7 G/DL (ref 2.8–4.4)
GLUCOSE BLD-MCNC: 85 MG/DL (ref 70–99)
HBA1C MFR BLD: 5.3 % (ref ?–5.7)
HCT VFR BLD AUTO: 39.3 %
HDLC SERPL-MCNC: 57 MG/DL (ref 40–59)
HGB BLD-MCNC: 13 G/DL
IMM GRANULOCYTES # BLD AUTO: 0.01 X10(3) UL (ref 0–1)
IMM GRANULOCYTES NFR BLD: 0.2 %
LDLC SERPL CALC-MCNC: 135 MG/DL (ref ?–100)
LYMPHOCYTES # BLD AUTO: 1.59 X10(3) UL (ref 1–4)
LYMPHOCYTES NFR BLD AUTO: 29.8 %
MCH RBC QN AUTO: 29.2 PG (ref 26–34)
MCHC RBC AUTO-ENTMCNC: 33.1 G/DL (ref 31–37)
MCV RBC AUTO: 88.3 FL
MONOCYTES # BLD AUTO: 0.35 X10(3) UL (ref 0.1–1)
MONOCYTES NFR BLD AUTO: 6.6 %
NEUTROPHILS # BLD AUTO: 3.15 X10 (3) UL (ref 1.5–7.7)
NEUTROPHILS # BLD AUTO: 3.15 X10(3) UL (ref 1.5–7.7)
NEUTROPHILS NFR BLD AUTO: 59.1 %
NONHDLC SERPL-MCNC: 150 MG/DL (ref ?–130)
OSMOLALITY SERPL CALC.SUM OF ELEC: 294 MOSM/KG (ref 275–295)
PLATELET # BLD AUTO: 219 10(3)UL (ref 150–450)
POTASSIUM SERPL-SCNC: 4.1 MMOL/L (ref 3.5–5.1)
PROT SERPL-MCNC: 6.9 G/DL (ref 5.7–8.2)
RBC # BLD AUTO: 4.45 X10(6)UL
SODIUM SERPL-SCNC: 142 MMOL/L (ref 136–145)
TRIGL SERPL-MCNC: 81 MG/DL (ref 30–149)
TSI SER-ACNC: 2.84 MIU/ML (ref 0.55–4.78)
VLDLC SERPL CALC-MCNC: 15 MG/DL (ref 0–30)
WBC # BLD AUTO: 5.3 X10(3) UL (ref 4–11)

## 2023-11-22 PROCEDURE — 80053 COMPREHEN METABOLIC PANEL: CPT

## 2023-11-22 PROCEDURE — 36415 COLL VENOUS BLD VENIPUNCTURE: CPT

## 2023-11-22 PROCEDURE — 85025 COMPLETE CBC W/AUTO DIFF WBC: CPT

## 2023-11-22 PROCEDURE — 83036 HEMOGLOBIN GLYCOSYLATED A1C: CPT

## 2023-11-22 PROCEDURE — 80061 LIPID PANEL: CPT

## 2023-11-22 PROCEDURE — 84443 ASSAY THYROID STIM HORMONE: CPT

## 2023-11-29 NOTE — TELEPHONE ENCOUNTER
Spoke with patient and advised that her mammogram is overdue and has been ordered.  She will schedule it md

## 2024-01-10 ENCOUNTER — TELEPHONE (OUTPATIENT)
Facility: CLINIC | Age: 64
End: 2024-01-10

## 2024-01-11 NOTE — TELEPHONE ENCOUNTER
Beto Roca MD   Physician  Gastroenterology     Operative Report  Signed     Date of Service: 1/19/2019  9:29 AM     Signed         [facility] Endoscopy Report        Preoperative Diagnosis:  - history of colon polyp - 2013 last exam     Postoperative Diagnosis:  - colon polyp x 2  - diverticulosis  - internal hemorrhoids        Procedure:    Colonoscopy        Surgeon:  Beto Roca M.D.     Anesthesia:  Fentanyl:  50 mcg IV  Midazolam:  4 mg IV in divided doses.  Sedation managed by Dr. Roca for 20 minutes.      Technique:  After informed consent, the patient was placed in the left lateral recumbent position.  Digital rectal examination revealed no palpable intraluminal abnormalities.  On her left upper leg a skin lesion was noted.  An Olympus variable stiffness 190 series HD colonoscope was inserted into the rectum and advanced under direct vision by following the lumen to the cecum.  The colon was examined upon withdrawal in the left lateral position.     The procedure was well tolerated without immediate complication.        Findings:  The preparation of the colon was good.  The ileocecal valve was well preserved. The visualized colonic mucosa from the cecum to the anal verge was normal with an intact vascular pattern.     Colon polyps x 2 removed as follows;  - ascending x 1, sessile 4 mm cold snare removed  - transverse x 1, diminutive cold forceps removed  Both sites free of bleeding and specimens sent to pathology.      Diverticular disease noted in the sigmoid colon, no diverticulitis noted.      Small internal hemorrhoids noted, some irritation noted above the hemorrhoids        Impression:  - colon polyp x 2  - diverticulosis  - internal hemorrhoids     Recommendations:  - Post polypectomy instructions given  - Repeat colonoscopy in 5 years  - High fiber diet for diverticular disease  - Symptomatic treatment of hemorrhoids  - Primary doctor for completion of heart workup  - Call  surgeon about the leg lesion              Beto Roca MD  1/19/2019  9:29 AM                 Beto Roca MD  1/29/2019  6:03 PM CST       RN place on call back for colon for 5 years  - please contact pt - Estonian speaking  - follow up with primary to complete heart workup  - colon polyps ok - adenomatous and thus call back 5 years  - skin lesion on leg - have her see one of our surgeons about removing this ok to set up appointment.             Contains abnormal data Specimen to Pathology, Tissue: [id number]  Order: [id number]  Collected 1/19/2019  9:17 AM       Status: Final result       Visible to patient: Yes (not seen)       Dx: Hx of colonic polyps    2 Result Notes       1 Follow-up Encounter        Component  Ref Range & Units      Case Report     Surgical Pathology                                Case: [id number]                                     Authorizing Provider:  Beto Roca MD       Collected:           01/19/2019 09:17 AM             Ordering Location:     [facility]          Received:            01/19/2019 11:23 AM                                    Endoscopy Lab Suites                                                           Pathologist:           Ramonita Ellis MD                                                           Specimens:   A) - Colon ascending, Polyp x 1                                                                        B) - Colon transverse, Polyp x 1                                                              Final Diagnosis:        A. Ascending colon polyp x1:   Fecal debris only.  No colonic tissue is present for evaluation.     B. Transverse colon polyp x1:   Serrated polyp with features suggestive of sessile serrated polyp/adenoma.     Electronically signed by Ramonita Ellis MD on 1/21/2019 at  2:01 PM        Clinical Information      Z86.010 Hx Of Colonic Polyps.          Gross Description      Specimen A is received in one formalin-filled jar  labeled \"Bowles, colon ascending polyp x1.\" The specimen consists of multiple possible fragmented pieces of yellow-tan tissue mixed with debridement material measuring in aggregate 2.5 x 2.0 x 0.2 cm. The possible tissue is submitted in total in cassette A1.      Specimen B is received in one formalin-filled jar labeled \"Bowles, transverse colon polyp x1.\" The specimen consists of two pieces of pink to yellow-tan soft tissue, the first measures <0.1, and the second measures 0.2 cm in greatest dimension. The specimen is submitted in cassette B. (cp)      Ramonita Ellis M.D./flavio         Interpretation     Abnormal Abnormal      Electronically signed by Ramonita Ellis MD on 1/21/2019 at  2:01 PM

## 2024-01-13 ENCOUNTER — APPOINTMENT (OUTPATIENT)
Dept: CT IMAGING | Facility: HOSPITAL | Age: 64
End: 2024-01-13
Attending: EMERGENCY MEDICINE
Payer: COMMERCIAL

## 2024-01-13 ENCOUNTER — APPOINTMENT (OUTPATIENT)
Dept: GENERAL RADIOLOGY | Facility: HOSPITAL | Age: 64
End: 2024-01-13
Attending: EMERGENCY MEDICINE
Payer: COMMERCIAL

## 2024-01-13 ENCOUNTER — HOSPITAL ENCOUNTER (EMERGENCY)
Facility: HOSPITAL | Age: 64
Discharge: HOME | End: 2024-01-13
Attending: EMERGENCY MEDICINE
Payer: COMMERCIAL

## 2024-01-13 ENCOUNTER — NURSE TRIAGE (OUTPATIENT)
Dept: FAMILY MEDICINE CLINIC | Facility: CLINIC | Age: 64
End: 2024-01-13

## 2024-01-13 VITALS
SYSTOLIC BLOOD PRESSURE: 169 MMHG | TEMPERATURE: 97 F | WEIGHT: 180 LBS | OXYGEN SATURATION: 97 % | BODY MASS INDEX: 30.73 KG/M2 | RESPIRATION RATE: 16 BRPM | HEIGHT: 64 IN | HEART RATE: 52 BPM | DIASTOLIC BLOOD PRESSURE: 77 MMHG

## 2024-01-13 DIAGNOSIS — R51.9 ACUTE NONINTRACTABLE HEADACHE, UNSPECIFIED HEADACHE TYPE: ICD-10-CM

## 2024-01-13 DIAGNOSIS — R07.89 CHEST PAIN, ATYPICAL: Primary | ICD-10-CM

## 2024-01-13 LAB
ALBUMIN SERPL-MCNC: 4.4 G/DL (ref 3.2–4.8)
ALBUMIN/GLOB SERPL: 1.5 {RATIO} (ref 1–2)
ALP LIVER SERPL-CCNC: 106 U/L
ALT SERPL-CCNC: 8 U/L
ANION GAP SERPL CALC-SCNC: 7 MMOL/L (ref 0–18)
AST SERPL-CCNC: 19 U/L (ref ?–34)
BASOPHILS # BLD AUTO: 0.04 X10(3) UL (ref 0–0.2)
BASOPHILS NFR BLD AUTO: 0.6 %
BILIRUB SERPL-MCNC: 0.5 MG/DL (ref 0.2–1.1)
BUN BLD-MCNC: 15 MG/DL (ref 9–23)
BUN/CREAT SERPL: 18.1 (ref 10–20)
CALCIUM BLD-MCNC: 9.5 MG/DL (ref 8.7–10.4)
CHLORIDE SERPL-SCNC: 108 MMOL/L (ref 98–112)
CO2 SERPL-SCNC: 26 MMOL/L (ref 21–32)
CREAT BLD-MCNC: 0.83 MG/DL
DEPRECATED RDW RBC AUTO: 41.1 FL (ref 35.1–46.3)
EGFRCR SERPLBLD CKD-EPI 2021: 80 ML/MIN/1.73M2 (ref 60–?)
EOSINOPHIL # BLD AUTO: 0.15 X10(3) UL (ref 0–0.7)
EOSINOPHIL NFR BLD AUTO: 2.1 %
ERYTHROCYTE [DISTWIDTH] IN BLOOD BY AUTOMATED COUNT: 12.7 % (ref 11–15)
GLOBULIN PLAS-MCNC: 2.9 G/DL (ref 2.8–4.4)
GLUCOSE BLD-MCNC: 86 MG/DL (ref 70–99)
HCT VFR BLD AUTO: 40.2 %
HGB BLD-MCNC: 13.2 G/DL
IMM GRANULOCYTES # BLD AUTO: 0.01 X10(3) UL (ref 0–1)
IMM GRANULOCYTES NFR BLD: 0.1 %
LYMPHOCYTES # BLD AUTO: 1.76 X10(3) UL (ref 1–4)
LYMPHOCYTES NFR BLD AUTO: 25.2 %
MCH RBC QN AUTO: 29 PG (ref 26–34)
MCHC RBC AUTO-ENTMCNC: 32.8 G/DL (ref 31–37)
MCV RBC AUTO: 88.4 FL
MONOCYTES # BLD AUTO: 0.38 X10(3) UL (ref 0.1–1)
MONOCYTES NFR BLD AUTO: 5.4 %
NEUTROPHILS # BLD AUTO: 4.64 X10 (3) UL (ref 1.5–7.7)
NEUTROPHILS # BLD AUTO: 4.64 X10(3) UL (ref 1.5–7.7)
NEUTROPHILS NFR BLD AUTO: 66.6 %
OSMOLALITY SERPL CALC.SUM OF ELEC: 292 MOSM/KG (ref 275–295)
PLATELET # BLD AUTO: 216 10(3)UL (ref 150–450)
POTASSIUM SERPL-SCNC: 3.8 MMOL/L (ref 3.5–5.1)
PROT SERPL-MCNC: 7.3 G/DL (ref 5.7–8.2)
RBC # BLD AUTO: 4.55 X10(6)UL
SODIUM SERPL-SCNC: 141 MMOL/L (ref 136–145)
TROPONIN I SERPL HS-MCNC: 7 NG/L
WBC # BLD AUTO: 7 X10(3) UL (ref 4–11)

## 2024-01-13 PROCEDURE — 84484 ASSAY OF TROPONIN QUANT: CPT | Performed by: EMERGENCY MEDICINE

## 2024-01-13 PROCEDURE — 93005 ELECTROCARDIOGRAM TRACING: CPT

## 2024-01-13 PROCEDURE — 99285 EMERGENCY DEPT VISIT HI MDM: CPT

## 2024-01-13 PROCEDURE — 85025 COMPLETE CBC W/AUTO DIFF WBC: CPT | Performed by: EMERGENCY MEDICINE

## 2024-01-13 PROCEDURE — 93010 ELECTROCARDIOGRAM REPORT: CPT

## 2024-01-13 PROCEDURE — 80053 COMPREHEN METABOLIC PANEL: CPT | Performed by: EMERGENCY MEDICINE

## 2024-01-13 PROCEDURE — 70450 CT HEAD/BRAIN W/O DYE: CPT | Performed by: EMERGENCY MEDICINE

## 2024-01-13 PROCEDURE — 36415 COLL VENOUS BLD VENIPUNCTURE: CPT

## 2024-01-13 PROCEDURE — 71045 X-RAY EXAM CHEST 1 VIEW: CPT | Performed by: EMERGENCY MEDICINE

## 2024-01-13 NOTE — TELEPHONE ENCOUNTER
Using language line : Omega ID number [id number]  Action Requested: Summary for Provider     []  Critical Lab, Recommendations Needed  [] Need Additional Advice  [x]   FYI    []   Need Orders  [] Need Medications Sent to Pharmacy  []  Other     SUMMARY: Per protocol disposition advised ER evaluation, patient will go to [facility].       Reason for call: Chest Pain, Syncope, Palpitations (/), and Headache  Onset: varied, see below     Patient transferred from [facility] in central scheduling. Patient reporting intermittent heart palpitations, intermitted chest pain and episode of syncope last week. These symptoms not presrnt now but patient states she has a headache currently.     Chest pain: last occurred 3 days ago, symptom lasts less then 5 minutes. Recommendation visit today.     Heart palpitations: last occurred 3 days ago, brief same as chest pain. Recommendation visit today.     Fainting: Occurred 1 week ago, no history of syncope. Recommendation: ER/[facility]    Headache: left side of head/temple, present 1-2 weeks consistently. Patient is rating pain as \"severe\" when she wakes up, gets better during the day (mild but still present) and then severe before bed again.No history of headaches. \"Worst headache of her life, especially at night and wakes her up out of sleep.\"     Denies other symptoms marked no under protocol.     Reason for Disposition   Chest pain(s) lasting a few seconds persists > 3 days   Age > 60 years  (Exception: Brief heartbeat symptoms that went away and now feels well.)   Age > 50 years   SEVERE headache, states 'worst headache' of life    Protocols used: Chest Pain-A-OH, Heart Rate and Heartbeat Xscnexfdg-B-TX, Xpxkaigg-T-BK, Headache-A-OH

## 2024-01-13 NOTE — ED PROVIDER NOTES
Patient Seen in: [facility] Emergency Department    History     Chief Complaint   Patient presents with    Chest Pain Angina       HPI    63-year-old female with no significant past medical history presents to the emergency department for evaluation syncopal episode and headache.  Patient states that about 10 days ago, she had gotten on an airplane to return home and she was feeling lightheaded.  She stood up in the aisle to go to the bathroom and put water on her face when she blacked out and fell to the ground.  She states that somebody helped her get to the ground and she did not hit her head.  She states since then she has been having left-sided headache that does respond to ibuprofen.  She states she is also been feeling some heart palpitation sensation and some intermittent chest pain that will last less than 5 minutes.  She denies any nausea, vomiting, diaphoresis, shortness of breath, leg pain or leg swelling.    History reviewed.   Past Medical History:   Diagnosis Date    Breast lump 2007    Management:  Left breast cyst aspiration    Lipid screening 03-    per [employer]    Musculoskeletal disease     per [employer]:  Spinal Degenerative Disc Disease    Personal history of colonic polyps     Sterilization        History reviewed.   Past Surgical History:   Procedure Laterality Date    BREAST BIOPSY      COLONOSCOPY  06/2013    COLONOSCOPY      COLONOSCOPY N/A 1/19/2019    Procedure: COLONOSCOPY;  Surgeon: Beto Roca MD;  Location: [facility] ENDOSCOPY    COLONOSCOPY & POLYPECTOMY  06/2013    NEEDLE BIOPSY RIGHT      OTHER SURGICAL HISTORY Left 2007    Left breast cyst aspiration    TUBAL LIGATION  2008         Medications :  (Not in a hospital admission)       Family History   Problem Relation Age of Onset    Other (was killed) Father     Heart Disease Maternal Grandfather         (cause of death)    Colon Cancer Other         Close relative/No Family hx of Colon Cancer    Breast Cancer Neg          No Family h/o Breast Cancer       Smoking Status:   Social History     Socioeconomic History    Marital status:    Tobacco Use    Smoking status: Never    Smokeless tobacco: Never   Vaping Use    Vaping Use: Never used   Substance and Sexual Activity    Alcohol use: No     Alcohol/week: 0.0 standard drinks of alcohol    Drug use: No   Other Topics Concern    Caffeine Concern No     Comment: Coffee, 16 oz daily    Exercise Yes     Comment: walking       Constitutional and vital signs reviewed.      Social History and Family History elements reviewed from today, pertinent positives to the presenting problem noted.    Physical Exam     ED Triage Vitals [01/13/24 1312]   /76   Pulse 61   Resp 16   Temp 97.1 °F (36.2 °C)   Temp src Temporal   SpO2 98 %   O2 Device None (Room air)       Physical Exam   Constitutional: AAOx3, well nourished, NAD  HEENT: Normocephalic, PERRLA, MMM  CV: s1s2+, RRR, no m/r/g, normal distal pulses  Pulmonary/Chest: CTA b/l with no rales, wheezes.  No chest wall tenderness  Abdominal: Nontender.  Nondistended. Soft. Bowel sounds are normal.   Neck/Back:   :   Musculoskeletal: Normal range of motion. No deformity.   Neurological: Awake, alert. Normal reflexes. No cranial nerve deficit.    Skin: Skin is warm and dry. No rash noted. No erythema.   Psychiatric:      All measures to prevent infection transmission during my interaction with the patient were taken. The patient was already wearing a droplet mask on my arrival to the room. Personal protective equipment was worn throughout the duration of the exam.      ED Course        Labs Reviewed   COMP METABOLIC PANEL (14) - Abnormal; Notable for the following components:       Result Value    ALT 8 (*)     All other components within normal limits   TROPONIN I HIGH SENSITIVITY - Normal   CBC WITH DIFFERENTIAL WITH PLATELET    Narrative:     The following orders were created for panel order CBC With Differential With Platelet.                   Procedure                               Abnormality         Status                                     ---------                               -----------         ------                                     CBC W/ DIFFERENTIAL[[id number]]                              Final result                                                 Please view results for these tests on the individual orders.   RAINBOW DRAW LAVENDER   RAINBOW DRAW LIGHT GREEN   RAINBOW DRAW BLUE   CBC W/ DIFFERENTIAL     My Independent Interpretation of EKG (if performed): EKG    Rate, intervals and axes as noted on EKG Report.  Rate: 60 bpm  Rhythm: Sinus Rhythm  Reading: Normal sinus rhythm, no acute ST changes, normal axis and intervals, no ectopy             Monitor Interpretation:   normal sinus rhythm as interpreted by me.      Imaging Results Available and Reviewed while in ED: CT BRAIN OR HEAD (30007)    Result Date: 1/13/2024  CONCLUSION:  1. No acute intracranial finding.    Dictated by (CST): Des Mazariegos MD on 1/13/2024 at 3:25 PM     Finalized by (CST): Des Mazariegos MD on 1/13/2024 at 3:27 PM          XR CHEST AP PORTABLE  (CPT=71045)    Result Date: 1/13/2024  CONCLUSION:  1. Mild pulmonary venous congestion may be exaggerated by suboptimal inspiration.    Dictated by (CST): Des Mazariegos MD on 1/13/2024 at 2:55 PM     Finalized by (CST): Des Mazariegos MD on 1/13/2024 at 2:55 PM         ED Medications Administered: Medications - No data to display          MDM     Vitals:    01/13/24 1312 01/13/24 1500 01/13/24 1545   BP: 160/76 159/76 (!) 169/77   Pulse: 61 50 52   Resp: 16 17 16   Temp: 97.1 °F (36.2 °C)     TempSrc: Temporal     SpO2: 98% 99% 97%   Weight: 81.6 kg     Height: 162.6 cm (5' 4\")       *I personally reviewed and interpreted all ED vitals.    Pulse Ox: 98%, Room air, Normal     Independent Interpretation of Studies: I have independently reviewed patient's CT scan of the head and chest x-ray.  There are no acute  findings.    History from Independent Source:       External Records Reviewed:     Social Determinants of Health:     Procedures:      Differential/MDM/Shared Decision Making: Differential diagnosis includes ACS, PE, dysrhythmia, dehydration, concussion, migraines, intracranial hemorrhage, others.         Patient's workup in the ED is unremarkable.  Patient has a heart score of 1.  She has no PE risk factors.  CT scan, EKG, labs, show no significant acute abnormalities.  Discussed case with patient and will have her follow-up with her primary care for further management.      Condition upon leaving the department: Stable    Disposition and Plan     Clinical Impression:  1. Chest pain, atypical    2. Acute nonintractable headache, unspecified headache type        Disposition:  Discharge    Follow-up:  Javed Decker, DO  [address] [place] [address] [phone]    Call in 2 day(s)        Medications Prescribed:  There are no discharge medications for this patient.

## 2024-01-13 NOTE — ED INITIAL ASSESSMENT (HPI)
Pt ambulatory to ED A&O x 4 w/ c/o: Headache & chest pain.  Pt reporting dull headache x 2 weeks.  Pt also reporting intermittent L sided chest \"pressure\" x 1 week.  Pt reporting episode of syncope on the airplane approx 1 week ago.

## 2024-01-13 NOTE — ED QUICK NOTES
Patient cleared for discharge by MD. Zimmermans with patient. Patient discharge instructions reviewed with patient including when and how to follow up  with healthcare provider and when to seek medical treatment. Peripheral IV removed.

## 2024-01-14 LAB
ATRIAL RATE: 60 BPM
P AXIS: 28 DEGREES
P-R INTERVAL: 170 MS
Q-T INTERVAL: 430 MS
QRS DURATION: 84 MS
QTC CALCULATION (BEZET): 430 MS
R AXIS: -10 DEGREES
T AXIS: 24 DEGREES
VENTRICULAR RATE: 60 BPM

## 2024-01-15 NOTE — TELEPHONE ENCOUNTER
Left message to call back using  Leelee ID # [id number]    Our office tried to contact patient on three separate occasions with no answer or call back.  No response letter mailed to home address.  Encounter closed per protocol.

## 2024-02-07 ENCOUNTER — HOSPITAL ENCOUNTER (OUTPATIENT)
Dept: CT IMAGING | Facility: HOSPITAL | Age: 64
Discharge: HOME | End: 2024-02-07
Attending: PHYSICIAN ASSISTANT

## 2024-02-07 DIAGNOSIS — E78.2 MIXED HYPERLIPIDEMIA: ICD-10-CM

## 2024-02-08 NOTE — PROGRESS NOTES
Date of Service 2/7/2024    TYESHA SOTELO  Date of Birth 12/17/1960    Patient Age: 63 year old    PCP: Javed Decker,   [address]  [address]  [place] [address]    Heart Scan Consult  Preliminary Heart Scan Score: 0    Previous Screening  Heart Scan Completed Previously: No        Peripheral Vascular Scan Completed Previously: No          Risk Factors  Personal Risk Factors  Non-alterable Risk Factors: Age;Gender;Family History (Reports, sisted had a heart attack at age 65)  Alterable Risk Factors: Abnormal Cholesterol      Body Mass Index  There is no height or weight on file to calculate BMI.    Blood Pressure    (Normal =< 120/80,  Elevated = 120-129/ >80,  High Stage1 130-139/80-89 , Stage2 >140/>90)    Lipid Profile  Cholesterol: 207, done on 11/22/2023.  HDL Cholesterol: 57, done on 11/22/2023.  LDL Cholesterol: 135, done on 11/22/2023.  TriGlycerides 81, done on 11/22/2023.    Cholesterol Goals  Value   Total  =< 200   HDL  = > 45 Men = > 55 Women   LDL   =< 100   Triglycerides  =< 150       Glucose and Hemoglobin A1C  Lab Results   Component Value Date    GLU 86 01/13/2024    A1C 5.3 11/22/2023     (Normal Fasting Glucose < 100mg/dl )    Nurse Review  Risk factor information and results reviewed with Nurse: Yes    Recommended Follow Up:  Consult your physician regarding:: Final Heart Scan Report;Discuss potential for Incidental Finding      Recommendations for Change:  Nutrition Changes: Low Saturated Fat;Low Salt Eating;Low Fat Dairy;Increase Fiber    Cholesterol Modification (goal of therapy depends upon your risk): Decrease LDL (Lousy/Bad) Ideal <100;Decrease Total Normal <200    Exercise: No Change Needed (Reports exercising regularly)              Stress Management: Adopt Stress Management Techniques    Repeat Heart Scan: 5 years if Calcium Score is 0.0;Discuss with your Physician              [place] Recommended Resources:  Recommended Resources: Upcoming Classes, Medical Services and  Health Library [email or web address]     Other Resources:: English and Indonesian handouts given about Heart Diseases, Controlling Risk Factors, Cholesterol, and Stress      Bee COELLO RN        Please Contact the Nurse Heart Line with any Questions or Concerns [phone].

## 2024-02-12 ENCOUNTER — TELEPHONE (OUTPATIENT)
Dept: FAMILY MEDICINE CLINIC | Facility: CLINIC | Age: 64
End: 2024-02-12

## 2024-02-12 NOTE — TELEPHONE ENCOUNTER
Left message for patient to call back, see below.  ---- Message from Tera Coughlin PA-C sent at 2/12/2024 12:46 PM CST -----  CT calcium score is normal.

## 2025-02-15 ENCOUNTER — HOSPITAL ENCOUNTER (OUTPATIENT)
Age: 65
Discharge: HOME | End: 2025-02-15
Payer: COMMERCIAL

## 2025-02-15 ENCOUNTER — APPOINTMENT (OUTPATIENT)
Dept: GENERAL RADIOLOGY | Age: 65
End: 2025-02-15
Attending: NURSE PRACTITIONER
Payer: COMMERCIAL

## 2025-02-15 VITALS
OXYGEN SATURATION: 95 % | TEMPERATURE: 99 F | RESPIRATION RATE: 18 BRPM | DIASTOLIC BLOOD PRESSURE: 53 MMHG | SYSTOLIC BLOOD PRESSURE: 114 MMHG | HEART RATE: 85 BPM

## 2025-02-15 DIAGNOSIS — J18.9 COMMUNITY ACQUIRED PNEUMONIA OF LEFT LOWER LOBE OF LUNG: Primary | ICD-10-CM

## 2025-02-15 PROCEDURE — 99213 OFFICE O/P EST LOW 20 MIN: CPT | Performed by: NURSE PRACTITIONER

## 2025-02-15 PROCEDURE — 71046 X-RAY EXAM CHEST 2 VIEWS: CPT | Performed by: NURSE PRACTITIONER

## 2025-02-15 RX ORDER — AZITHROMYCIN 250 MG/1
TABLET, FILM COATED ORAL
Qty: 6 TABLET | Refills: 0 | Status: SHIPPED | OUTPATIENT
Start: 2025-02-15 | End: 2025-02-20

## 2025-02-15 RX ORDER — BENZONATATE 100 MG/1
100 CAPSULE ORAL 3 TIMES DAILY PRN
Qty: 30 CAPSULE | Refills: 0 | Status: SHIPPED | OUTPATIENT
Start: 2025-02-15 | End: 2025-03-17

## 2025-02-15 NOTE — ED PROVIDER NOTES
Patient Seen in: [facility]      History     Chief Complaint   Patient presents with    Cough     Stated Complaint: Cough, Sore throat  Subjective:   HPI    This is a well-appearing 64-year-old female who presents with cough.  Patient states she has had a cough over the last 3 weeks.  Last night cough worsened, she also started having fever.  Tmax 101 which prompted the visit here today.  No body aches, chills, shortness of breath or chest pain.  No history of smoking.  No sick contacts.      Objective:   Past Medical History:    Breast lump    Management:  Left breast cyst aspiration    Lipid screening    per [employer]    Musculoskeletal disease    per [employer]:  Spinal Degenerative Disc Disease    Personal history of colonic polyps    Sterilization            Past Surgical History:   Procedure Laterality Date    Breast biopsy      Colonoscopy  06/2013    Colonoscopy      Colonoscopy N/A 1/19/2019    Procedure: COLONOSCOPY;  Surgeon: Beto Roca MD;  Location: [facility] ENDOSCOPY    Colonoscopy & polypectomy  06/2013    Needle biopsy right      Other surgical history Left 2007    Left breast cyst aspiration    Tubal ligation  2008              Social History     Socioeconomic History    Marital status:    Tobacco Use    Smoking status: Never    Smokeless tobacco: Never   Vaping Use    Vaping status: Never Used   Substance and Sexual Activity    Alcohol use: No     Alcohol/week: 0.0 standard drinks of alcohol    Drug use: No   Other Topics Concern    Caffeine Concern No     Comment: Coffee, 16 oz daily    Exercise Yes     Comment: walking   Social History Narrative    The patient does not use an assistive device..      The patient does not live in a home with stairs.            Review of Systems   All other systems reviewed and are negative.      Positive for stated complaint: Cough    Other systems are as noted in HPI.  Constitutional and vital signs reviewed.      All other systems reviewed and  negative except as noted above.    Physical Exam     ED Triage Vitals [02/15/25 1318]   /53   Pulse 85   Resp 18   Temp 98.9 °F (37.2 °C)   Temp src Oral   SpO2 95 %   O2 Device None (Room air)     Current:/53   Pulse 85   Temp 98.9 °F (37.2 °C) (Oral)   Resp 18   SpO2 95%     Physical Exam  Vitals and nursing note reviewed.   Constitutional:       General: She is awake. She is not in acute distress.     Appearance: Normal appearance. She is not ill-appearing, toxic-appearing or diaphoretic.   HENT:      Head: Normocephalic and atraumatic.      Right Ear: Tympanic membrane, ear canal and external ear normal.      Left Ear: Tympanic membrane, ear canal and external ear normal.      Nose: Nose normal.      Mouth/Throat:      Mouth: Mucous membranes are moist.      Pharynx: Oropharynx is clear. Uvula midline.   Eyes:      General: Lids are normal.      Extraocular Movements: Extraocular movements intact.      Conjunctiva/sclera: Conjunctivae normal.      Pupils: Pupils are equal, round, and reactive to light.   Cardiovascular:      Rate and Rhythm: Normal rate and regular rhythm.      Pulses: Normal pulses.      Heart sounds: Normal heart sounds.   Pulmonary:      Effort: Pulmonary effort is normal.      Breath sounds: Rhonchi present.   Skin:     General: Skin is warm and dry.      Capillary Refill: Capillary refill takes less than 2 seconds.   Neurological:      General: No focal deficit present.      Mental Status: She is alert and oriented to person, place, and time.   Psychiatric:         Mood and Affect: Mood normal.         Behavior: Behavior normal. Behavior is cooperative.         Thought Content: Thought content normal.         Judgment: Judgment normal.       ED Course   X-ray and reevaluate.  X-ray reviewed, focal opacity in the left lower posterior lobe.    COMPARISON: None.      INDICATIONS: Cough and sore throat x3 weeks.      TECHNIQUE:   Two views.       Findings and impression:       Normal heart size with no edema      Approximate 6.5 x 4.5 centimeter focal opacity in the posterior left lower lobe, nonspecific      No pneumothorax or pleural effusion   Finalized by (CST): Kris Crane MD on 2/15/2025 at 1:41 PM     No results found.   No results found.  Labs Reviewed - No data to display    MDM     Medical Decision Making  Differential diagnoses reflecting the complexity of care include but are not limited to pneumonia, bronchitis, lower respiratory infection.    Comorbidities that add complexity to management include: N/A  History obtained by an independent source was from: N/A  Discussions of management was done with: N/A  My independent interpretations of studies include: Chest x-ray, focal opacity in the posterior left lower lobe.  Shared decision making was done by: Patient and myself  Patient is well appearing, non-toxic and in no acute distress.  Vital signs are stable.  Discussed the x-ray findings with the patient.  I did discuss with her we will treat at this time with Augmentin and azithromycin.  It is imperative that she follows up with Dr. Decker.  She is not hypoxic or tachycardic, in no distress.  Discussed any worsening symptoms she should immediately be seen in the emergency department.  Patient verbalized plan of care and states understanding.    Problems Addressed:  Community acquired pneumonia of left lower lobe of lung: acute illness or injury    Amount and/or Complexity of Data Reviewed  Radiology: ordered and independent interpretation performed. Decision-making details documented in ED Course.  ECG/medicine tests: ordered and independent interpretation performed. Decision-making details documented in ED Course.    Risk  OTC drugs.  Prescription drug management.        Disposition and Plan     Clinical Impression:  1. Community acquired pneumonia of left lower lobe of lung         Disposition:  Discharge  2/15/2025  1:53 pm    Follow-up:  Javed Decker, DO  [address] [place] [address]  [phone]                Medications Prescribed:  Current Discharge Medication List        START taking these medications    Details   amoxicillin clavulanate 875-125 MG Oral Tab Take 1 tablet by mouth 2 (two) times daily for 10 days.  Qty: 20 tablet, Refills: 0      azithromycin (ZITHROMAX Z-ALEISHA) 250 MG Oral Tab 500 mg once followed by 250 mg daily x 4 days  Qty: 6 tablet, Refills: 0      benzonatate 100 MG Oral Cap Take 1 capsule (100 mg total) by mouth 3 (three) times daily as needed for cough.  Qty: 30 capsule, Refills: 0                Note to patient: The 21st Century cares act makes medical notes like these available to patients in the interest of transparency.  However, be advised this medical document and is intended as peer to peer communication.  It is read the medical language and may contain abbreviations or verbiage that are unfamiliar.  It may appear blunt or direct.  Medical documents are intended to carry relevant information, fax is evident and the clinical opinion of the practitioner.    This note was prepared using Dragon Medical voice recognition dictation software.  As a result, errors may occur.  When identified, these errors have been corrected.  While every attempt is made to correct errors during dictation, discrepancies may still exist.    Kelsie Rincon, APRN  2/15/2025  1:50 PM

## 2025-02-15 NOTE — DISCHARGE INSTRUCTIONS
Please take the antibiotics as prescribed. Any shortness of breath please go to the Emergency Room

## 2025-02-17 ENCOUNTER — TELEPHONE (OUTPATIENT)
Facility: CLINIC | Age: 65
End: 2025-02-17

## 2025-02-17 ENCOUNTER — OFFICE VISIT (OUTPATIENT)
Dept: FAMILY MEDICINE CLINIC | Facility: CLINIC | Age: 65
End: 2025-02-17

## 2025-02-17 VITALS
HEART RATE: 75 BPM | DIASTOLIC BLOOD PRESSURE: 80 MMHG | SYSTOLIC BLOOD PRESSURE: 135 MMHG | WEIGHT: 173.19 LBS | BODY MASS INDEX: 29.57 KG/M2 | HEIGHT: 64 IN

## 2025-02-17 DIAGNOSIS — Z86.0100 HISTORY OF COLON POLYPS: ICD-10-CM

## 2025-02-17 DIAGNOSIS — Z12.11 COLON CANCER SCREENING: Primary | ICD-10-CM

## 2025-02-17 DIAGNOSIS — Z12.31 SCREENING MAMMOGRAM, ENCOUNTER FOR: Primary | ICD-10-CM

## 2025-02-17 DIAGNOSIS — Z12.11 SCREENING FOR MALIGNANT NEOPLASM OF COLON: ICD-10-CM

## 2025-02-17 DIAGNOSIS — R91.1 LESION OF LEFT LUNG: ICD-10-CM

## 2025-02-17 DIAGNOSIS — E78.00 PURE HYPERCHOLESTEROLEMIA: ICD-10-CM

## 2025-02-17 NOTE — TELEPHONE ENCOUNTER
Patient Mauritanian speaking is following up on telephone encounter 1/10/24 about the recall letter please call

## 2025-02-17 NOTE — PROGRESS NOTES
Blood pressure 135/80, pulse 75, height 5' 4\" (1.626 m), weight 173 lb 3.2 oz (78.6 kg).      3-week history of cough nocturnal patient being treated for pneumonia.  No phlegm production no fever no chills no difficulty breathing.  No ear pain no stuffy nose.  No headache.  No facial pressure no bad breath.  No loss of voice no sneezing no watery or itchy eyes.  Took Advil PM last night.  She does not smoke or have asthma.    Objective throat erythematous no exudate    Lungs clear to auscultation no rales rhonchi or wheezes    Reviewed chest x-ray showing lesion in the left lower lobe posterior    Assessment status post pneumonia with lung lesion    Plan CT scan of the lungs ordered    Complete antibiotics    Mammogram ordered    Colonoscopy ordered    Fasting blood test ordered    Follow-up in 1 month for complete physical

## 2025-02-18 NOTE — TELEPHONE ENCOUNTER
Colon recall.     Reviewed allergies pharmacy, medications, medical/surgical history on file, and GI symptoms.     Please provide orders if ok to schedule directly.     Last Procedure, Date, MD:  colonoscopy 01/19/2019  Last Diagnosis:   colon polyp x 2  - diverticulosis  - internal hemorrhoids  Recalled (mth/yrs): 5 years  Sedation Used Previously:  Fentanyl:  50 mcg IV  Midazolam:  4 mg IV in divided doses.  Last Prep Used (if known):  Colyte  Quality Of Prep (if known): good  Anticoagulants:no  Diuretics: no  Diabetic Medication (Includes Insulin): no  Weight loss Medication: no  Iron/Herbal/Multivitamin Supplements:no  Marijuana/Vaping/CBD:no  Height/Weight:5'4/173  BMI:29.72  Hx of Cardiac &/or CVA Issues (MI/Stroke):no  If yes, in the last 12 months?   Devices Pacemaker/Defibrillator/Stent(s):no  Respiratory Issues/Oxygen Use/HA/COPD:no  CiPAP/BiPAP: no  Issues w/ Anesthesia:no    Symptoms (Y/N): none  Symptoms Details:     Special Comments/Notes:    Thank you!      Operative Report signed by Beto Roca MD at 1/19/2019  9:33 AM  Version 1 of 1  Author: Beto Roca MD Service: Gastroenterology Author Type: Physician   Filed: 1/19/2019  9:33 AM Date of Service: 1/19/2019  9:29 AM Status: Signed   : Beto Roca MD (Physician)   [facility] Endoscopy Report        Preoperative Diagnosis:  - history of colon polyp - 2013 last exam     Postoperative Diagnosis:  - colon polyp x 2  - diverticulosis  - internal hemorrhoids        Procedure:    Colonoscopy        Surgeon:  Beto Roca M.D.     Anesthesia:  Fentanyl:  50 mcg IV  Midazolam:  4 mg IV in divided doses.  Sedation managed by Dr. Roca for 20 minutes.      Technique:  After informed consent, the patient was placed in the left lateral recumbent position.  Digital rectal examination revealed no palpable intraluminal abnormalities.  On her left upper leg a skin lesion was noted.  An Olympus variable stiffness 190  series HD colonoscope was inserted into the rectum and advanced under direct vision by following the lumen to the cecum.  The colon was examined upon withdrawal in the left lateral position.     The procedure was well tolerated without immediate complication.        Findings:  The preparation of the colon was good.  The ileocecal valve was well preserved. The visualized colonic mucosa from the cecum to the anal verge was normal with an intact vascular pattern.     Colon polyps x 2 removed as follows;  - ascending x 1, sessile 4 mm cold snare removed  - transverse x 1, diminutive cold forceps removed  Both sites free of bleeding and specimens sent to pathology.      Diverticular disease noted in the sigmoid colon, no diverticulitis noted.      Small internal hemorrhoids noted, some irritation noted above the hemorrhoids        Impression:  - colon polyp x 2  - diverticulosis  - internal hemorrhoids     Recommendations:  - Post polypectomy instructions given  - Repeat colonoscopy in 5 years  - High fiber diet for diverticular disease  - Symptomatic treatment of hemorrhoids  - Primary doctor for completion of heart workup  - Call surgeon about the leg lesion              Beto Roca MD  1/19/2019  9:29 AM        Final Diagnosis:      A. Ascending colon polyp x1:   Fecal debris only.  No colonic tissue is present for evaluation.     B. Transverse colon polyp x1:   Serrated polyp with features suggestive of sessile serrated polyp/adenoma.   Electronically signed by Ramonita Ellis MD on 1/21/2019 at  2:01 PM

## 2025-02-19 NOTE — TELEPHONE ENCOUNTER
Scheduled for:  Colonoscopy [id number]  Provider Name:  Dr. Roca  Date:  4/25/2025  Location:   [facility]  Sedation:  MAC  Time:  2:30 PM - Patient is aware NE will call the day before with arrival time.  Prep:  Golytely  Meds/Allergies Reconciled?:  Physician reviewed   Diagnosis with codes:  Colon cancer screening Z12.11; Hx: Colon polyps Z86.010  Was patient informed to call insurance with codes (Y/N):  Yes, I confirmed BCBS PPO insurance with the patient.   Referral sent?:  Referral was sent at the time of electronic surgical scheduling.   EM or [facility] notified?:  I sent an electronic request to Endo Scheduling and received a confirmation today.   Medication Orders:  N/A  Misc Orders:  N/A     Further instructions given by staff:   I discussed the prep instructions with the patient which she verbally understood and is aware that I will send the instructions today.

## 2025-02-24 ENCOUNTER — HOSPITAL ENCOUNTER (OUTPATIENT)
Dept: CT IMAGING | Facility: HOSPITAL | Age: 65
Discharge: HOME | End: 2025-02-24
Attending: FAMILY MEDICINE
Payer: COMMERCIAL

## 2025-02-24 DIAGNOSIS — R91.1 LESION OF LEFT LUNG: ICD-10-CM

## 2025-02-24 LAB
CREAT BLD-MCNC: 0.9 MG/DL
EGFRCR SERPLBLD CKD-EPI 2021: 72 ML/MIN/1.73M2 (ref 60–?)

## 2025-02-24 PROCEDURE — 71260 CT THORAX DX C+: CPT | Performed by: FAMILY MEDICINE

## 2025-02-24 PROCEDURE — 82565 ASSAY OF CREATININE: CPT

## 2025-04-25 ENCOUNTER — ANESTHESIA EVENT (OUTPATIENT)
Dept: ENDOSCOPY | Age: 65
End: 2025-04-25
Payer: COMMERCIAL

## 2025-04-25 ENCOUNTER — HOSPITAL ENCOUNTER (OUTPATIENT)
Age: 65
Setting detail: HOSPITAL OUTPATIENT SURGERY
Discharge: HOME | End: 2025-04-25
Attending: INTERNAL MEDICINE | Admitting: INTERNAL MEDICINE
Payer: COMMERCIAL

## 2025-04-25 ENCOUNTER — ANESTHESIA (OUTPATIENT)
Dept: ENDOSCOPY | Age: 65
End: 2025-04-25
Payer: COMMERCIAL

## 2025-04-25 VITALS
DIASTOLIC BLOOD PRESSURE: 76 MMHG | HEART RATE: 51 BPM | RESPIRATION RATE: 14 BRPM | SYSTOLIC BLOOD PRESSURE: 160 MMHG | HEIGHT: 64 IN | OXYGEN SATURATION: 99 % | WEIGHT: 173 LBS | BODY MASS INDEX: 29.53 KG/M2

## 2025-04-25 DIAGNOSIS — Z12.11 COLON CANCER SCREENING: ICD-10-CM

## 2025-04-25 DIAGNOSIS — Z86.0100 HISTORY OF COLON POLYPS: ICD-10-CM

## 2025-04-25 PROCEDURE — 88305 TISSUE EXAM BY PATHOLOGIST: CPT | Performed by: INTERNAL MEDICINE

## 2025-04-25 RX ORDER — SODIUM CHLORIDE, SODIUM LACTATE, POTASSIUM CHLORIDE, CALCIUM CHLORIDE 600; 310; 30; 20 MG/100ML; MG/100ML; MG/100ML; MG/100ML
INJECTION, SOLUTION INTRAVENOUS CONTINUOUS
Status: DISCONTINUED | OUTPATIENT
Start: 2025-04-25 | End: 2025-04-25

## 2025-04-25 RX ORDER — NALOXONE HYDROCHLORIDE 0.4 MG/ML
0.08 INJECTION, SOLUTION INTRAMUSCULAR; INTRAVENOUS; SUBCUTANEOUS ONCE AS NEEDED
Status: DISCONTINUED | OUTPATIENT
Start: 2025-04-25 | End: 2025-04-25

## 2025-04-25 RX ORDER — LIDOCAINE HYDROCHLORIDE 10 MG/ML
INJECTION, SOLUTION EPIDURAL; INFILTRATION; INTRACAUDAL; PERINEURAL AS NEEDED
Status: DISCONTINUED | OUTPATIENT
Start: 2025-04-25 | End: 2025-04-25 | Stop reason: SURG

## 2025-04-25 RX ADMIN — SODIUM CHLORIDE, SODIUM LACTATE, POTASSIUM CHLORIDE, CALCIUM CHLORIDE: 600; 310; 30; 20 INJECTION, SOLUTION INTRAVENOUS at 10:35:00

## 2025-04-25 RX ADMIN — SODIUM CHLORIDE, SODIUM LACTATE, POTASSIUM CHLORIDE, CALCIUM CHLORIDE: 600; 310; 30; 20 INJECTION, SOLUTION INTRAVENOUS at 10:46:00

## 2025-04-25 RX ADMIN — LIDOCAINE HYDROCHLORIDE 50 MG: 10 INJECTION, SOLUTION EPIDURAL; INFILTRATION; INTRACAUDAL; PERINEURAL at 10:37:00

## 2025-04-25 NOTE — ANESTHESIA PREPROCEDURE EVALUATION
Anesthesia PreOp Note    HPI:     Frida Bowles is a 64 year old female who presents for preoperative consultation requested by: Beto Roca MD    Date of Surgery: 4/25/2025    Procedure(s):  COLONOSCOPY  Indication: Colon cancer screening/ History of colon polyps    Relevant Problems   No relevant active problems       NPO:  Last Liquid Consumption Date: 04/25/25  Last Liquid Consumption Time: 0700  Last Solid Consumption Date: 04/24/25  Last Solid Consumption Time: 1200  Last Liquid Consumption Date: 04/25/25          History Review:  Patient Active Problem List    Diagnosis Date Noted    Class 1 obesity due to excess calories without serious comorbidity with body mass index (BMI) of 30.0 to 30.9 in adult 08/22/2023    Pelvic relaxation due to urethrocele 08/17/2020    Female cystocele 08/17/2020    Pure hypercholesterolemia 01/03/2018    Vitamin D deficiency 01/03/2018    Abnormal MRI of head 09/29/2016       Past Medical History[1]    Past Surgical History[2]    Prescriptions Prior to Admission[3]  Current Medications and Prescriptions Ordered in Epic[4]    Allergies[5]    Family History[6]  Social Hx on file[7]    Available pre-op labs reviewed.             Vital Signs:  Body mass index is 29.7 kg/m².   height is 1.626 m (5' 4\") and weight is 78.5 kg (173 lb).   Vitals:    04/17/25 1332   Weight: 78.5 kg (173 lb)   Height: 1.626 m (5' 4\")        Anesthesia Evaluation      Airway   Mallampati: I  TM distance: >3 FB  Neck ROM: full  Dental          Pulmonary - negative ROS and normal exam   Cardiovascular - negative ROS and normal exam    Neuro/Psych      GI/Hepatic/Renal - negative ROS     Endo/Other - negative ROS   Abdominal                  Anesthesia Plan:   ASA:  1  Plan:   MAC  Plan Comments: I have discussed the anesthetic plan, major risks and alternatives with the patient and answered all questions. The patient desires to proceed with surgery and anesthesia as planned.     Informed Consent Plan  and Risks Discussed With:  Patient      I have informed Frida Bowles and/or legal guardian or family member of the nature of the anesthetic plan, benefits, risks including possible dental damage if relevant, major complications, and any alternative forms of anesthetic management.   All of the patient's questions were answered to the best of my ability. The patient desires the anesthetic management as planned.  KARIS SOTO DO  2025 9:50 AM  Present on Admission:  **None**           [1]   Past Medical History:   Breast lump    Management:  Left breast cyst aspiration    Lipid screening    per [employer]    Musculoskeletal disease    per [employer]:  Spinal Degenerative Disc Disease    Personal history of colonic polyps    Sterilization   [2]   Past Surgical History:  Procedure Laterality Date    Breast biopsy      Colonoscopy  2013    Colonoscopy      Colonoscopy N/A 2019    Procedure: COLONOSCOPY;  Surgeon: Beto Roca MD;  Location: [facility] ENDOSCOPY    Colonoscopy & polypectomy  2013    Needle biopsy right      Other surgical history Left     Left breast cyst aspiration    Tubal ligation     [3]   Medications Prior to Admission   Medication Sig Dispense Refill Last Dose/Taking    [] polyethylene glycol, PEG 3350-KCl-NaBcb-NaCl-NaSulf, 236 g Oral Recon Soln Take 4,000 mL by mouth once for 1 dose. As directed by GI clinic instructions. 4000 mL 0     [] amoxicillin clavulanate 875-125 MG Oral Tab Take 1 tablet by mouth 2 (two) times daily for 10 days. 20 tablet 0     [] azithromycin (ZITHROMAX Z-ALEISHA) 250 MG Oral Tab 500 mg once followed by 250 mg daily x 4 days 6 tablet 0     [] benzonatate 100 MG Oral Cap Take 1 capsule (100 mg total) by mouth 3 (three) times daily as needed for cough. 30 capsule 0    [4]   Current Facility-Administered Medications Ordered in Epic   Medication Dose Route Frequency Provider Last Rate Last Admin    lactated ringers infusion    Intravenous Continuous Beto Roca MD         No current [facility]-ordered outpatient medications on file.   [5] No Known Allergies  [6]   Family History  Problem Relation Age of Onset    Other (was killed) Father     Heart Disease Maternal Grandfather         (cause of death)    Colon Cancer Other         Close relative/No Family hx of Colon Cancer    Breast Cancer Neg         No Family h/o Breast Cancer   [7]   Social History  Socioeconomic History    Marital status:    Tobacco Use    Smoking status: Never     Passive exposure: Never    Smokeless tobacco: Never   Vaping Use    Vaping status: Never Used   Substance and Sexual Activity    Alcohol use: No     Alcohol/week: 0.0 standard drinks of alcohol    Drug use: No   Other Topics Concern    Caffeine Concern No     Comment: Coffee, 16 oz daily    Exercise Yes     Comment: walking

## 2025-04-25 NOTE — OPERATIVE REPORT
[facility] Endoscopy Report  Date of procedure-April 25, 2025    Preoperative Diagnosis:  - Colon cancer screening    Postoperative Diagnosis:  - Colon polyps x 2  - Diverticulosis  - Internal hemorrhoids    Procedure:    Colonoscopy     Surgeon:  Beto Roca M.D.    Anesthesia:  MAC     Technique:  After informed consent, the patient was placed in the left lateral recumbent position.  Digital rectal examination revealed no palpable intraluminal abnormalities.  An Olympus variable stiffness 190 series HD colonoscope was inserted into the rectum and advanced under direct vision by following the lumen to the cecum.  The colon was examined upon withdrawal in the left lateral position.  The procedure was well tolerated without immediate complication.      Findings:  The preparation of the colon was good.  The terminal ileum was examined for 4 cm and visually normal.  The ileocecal valve was well preserved. The visualized colonic mucosa from the cecum to the anal verge was normal with an intact vascular pattern.    Sigmoid colon polyps x 2, both were diminutive and removed by cold forceps technique.  Both sites were inspected and found to be free of bleeding and specimens retrieved and sent for analysis.    Diverticular disease located in the left colon, no diverticulitis.    Small internal hemorrhoids noted on retroflexed view.    Estimated blood loss-insignificant  Specimens-see above    Impression:  - Colon polyps x 2  - Diverticulosis  - Internal hemorrhoids    Recommendations:  - Post polypectomy instructions given  - Repeat colonoscopy in 5 years  - High fiber diet for diverticular disease  - Symptomatic treatment of hemorrhoids          Beto Roca MD  4/25/2025  11:00 AM

## 2025-04-25 NOTE — H&P
History & Physical Examination    Patient Name: Frida Bowles  MRN: [id number]  [facility]: [id number]  YOB: 1960    Diagnosis: hx colon polyp   CRC Screening        Prescriptions Prior to Admission[1]  Current Hospital Medications[2]    Allergies: Allergies[3]    Past Medical History[4]  Past Surgical History[5]  Family History[6]  Social History     Tobacco Use    Smoking status: Never     Passive exposure: Never    Smokeless tobacco: Never   Substance Use Topics    Alcohol use: No     Alcohol/week: 0.0 standard drinks of alcohol       SYSTEM Check if Review is Normal Check if Physical Exam is Normal If not normal, please explain:   HEENT [x ] [ x]    NECK & BACK [x ] [x ]    HEART [x ] [ x]    LUNGS [x ] [ x]    ABDOMEN [x ] [x ]    UROGENITAL [ ] [ ]    EXTREMITIES [x ] [x ]    OTHER        [ x ] I have discussed the risks and benefits and alternatives with the patient/family.  They understand and agree to proceed with plan of care.  [ x ] I have reviewed the History and Physical done within the last 30 days.  Any changes noted above.    Beto Roca MD  2025  10:19 AM         [1]   Medications Prior to Admission   Medication Sig Dispense Refill Last Dose/Taking    [] polyethylene glycol, PEG 3350-KCl-NaBcb-NaCl-NaSulf, 236 g Oral Recon Soln Take 4,000 mL by mouth once for 1 dose. As directed by GI clinic instructions. 4000 mL 0     [] amoxicillin clavulanate 875-125 MG Oral Tab Take 1 tablet by mouth 2 (two) times daily for 10 days. 20 tablet 0     [] azithromycin (ZITHROMAX Z-ALEISHA) 250 MG Oral Tab 500 mg once followed by 250 mg daily x 4 days 6 tablet 0     [] benzonatate 100 MG Oral Cap Take 1 capsule (100 mg total) by mouth 3 (three) times daily as needed for cough. 30 capsule 0    [2]   Current Facility-Administered Medications   Medication Dose Route Frequency    lactated ringers infusion   Intravenous Continuous   [3] No Known Allergies  [4]   Past Medical  History:   Breast lump    Management:  Left breast cyst aspiration    Lipid screening    per [facility]    Musculoskeletal disease    per [facility]:  Spinal Degenerative Disc Disease    Personal history of colonic polyps    Sterilization   [5]   Past Surgical History:  Procedure Laterality Date    Breast biopsy      Colonoscopy  06/2013    Colonoscopy      Colonoscopy N/A 1/19/2019    Procedure: COLONOSCOPY;  Surgeon: Beto Roca MD;  Location: [facility] ENDOSCOPY    Colonoscopy & polypectomy  06/2013    Needle biopsy right      Other surgical history Left 2007    Left breast cyst aspiration    Tubal ligation  2008   [6]   Family History  Problem Relation Age of Onset    Other (was killed) Father     Heart Disease Maternal Grandfather         (cause of death)    Colon Cancer Other         Close relative/No Family hx of Colon Cancer    Breast Cancer Neg         No Family h/o Breast Cancer

## 2025-04-25 NOTE — ANESTHESIA POSTPROCEDURE EVALUATION
Patient: Frida Bowles    Procedure Summary       Date: 04/25/25 Room / Location: [facility] ENDOSCOPY 01 / [facility] ENDO    Anesthesia Start: 1035 Anesthesia Stop: 1059    Procedure: COLONOSCOPY with polypectomy Diagnosis:       Colon cancer screening      History of colon polyps      (Polyps, hemorrhoids)    Surgeons: Beto oRca MD Anesthesiologist: Toño Law DO    Anesthesia Type: MAC ASA Status: 1            Anesthesia Type: MAC    Vitals Value Taken Time   /72 04/25/25 11:00   Temp  04/25/25 11:03   Pulse 55 04/25/25 11:02   Resp 20 04/25/25 11:02   SpO2 97 % 04/25/25 11:02   Vitals shown include unfiled device data.    [facility] AN Post Evaluation:   Patient Evaluated in PACU  Patient Participation: complete - patient participated  Level of Consciousness: awake  Pain Management: adequate  Airway Patency:patent  Dental exam unchanged from preop  Yes    Nausea/Vomiting: none  Cardiovascular Status: acceptable  Respiratory Status: acceptable and room air  Postoperative Hydration acceptable      TOÑO LAW DO  4/25/2025 11:03 AM

## 2025-04-25 NOTE — DISCHARGE INSTRUCTIONS
Home Care Instructions for Colonoscopy with Sedation    Diet:  - Resume your regular diet as tolerated unless otherwise instructed.  - Start with light meals to minimize bloating.  - Do not drink alcohol today.    Medication:  - If you have questions about resuming your normal medications, please contact your Primary Care Physician.    Activities:  - Take it easy today. Do not return to work today.  - Do not drive today.  - Do not operate any machinery today (including kitchen equipment).  -   Do not make any critical decisions or sign any paperwork.  - Do not exercise today.    Colonoscopy:  - You may notice some rectal \"spotting\" (a little blood on the toilet tissue) for a day or two after the exam. This is normal.  - If you experience any rectal bleeding (not spotting), persistent tenderness or sharp severe abdominal pains, oral temperature over 100 degrees Fahrenheit, light-headedness or dizziness, or any other problems, contact your doctor.      **If unable to reach your doctor, please go to the [facility] Emergency Room**    - Your referring physician will receive a full report of your examination.  - If you do not hear from your doctor's office within two weeks of your biopsy, please call them for your results.    You may be able to see your laboratory results in [employer] between 4 and 7 business days.  In some cases, your physician may not have viewed the results before they are released to [employer].  If you have questions regarding your results contact the physician who ordered the test/exam by phone or via [employer] by choosing \"Ask a Medical Question.\"

## 2025-04-26 ENCOUNTER — LAB ENCOUNTER (OUTPATIENT)
Dept: LAB | Age: 65
End: 2025-04-26
Attending: FAMILY MEDICINE
Payer: COMMERCIAL

## 2025-04-26 DIAGNOSIS — E78.00 PURE HYPERCHOLESTEROLEMIA: ICD-10-CM

## 2025-04-26 LAB
ALT SERPL-CCNC: 11 U/L (ref 10–49)
ANION GAP SERPL CALC-SCNC: 9 MMOL/L (ref 0–18)
AST SERPL-CCNC: 19 U/L (ref ?–34)
BUN BLD-MCNC: 15 MG/DL (ref 9–23)
BUN/CREAT SERPL: 16.5 (ref 10–20)
CALCIUM BLD-MCNC: 9.1 MG/DL (ref 8.7–10.4)
CHLORIDE SERPL-SCNC: 105 MMOL/L (ref 98–112)
CHOLEST SERPL-MCNC: 225 MG/DL (ref ?–200)
CO2 SERPL-SCNC: 28 MMOL/L (ref 21–32)
CREAT BLD-MCNC: 0.91 MG/DL (ref 0.55–1.02)
EGFRCR SERPLBLD CKD-EPI 2021: 71 ML/MIN/1.73M2 (ref 60–?)
FASTING PATIENT LIPID ANSWER: YES
FASTING STATUS PATIENT QL REPORTED: YES
GLUCOSE BLD-MCNC: 93 MG/DL (ref 70–99)
HDLC SERPL-MCNC: 55 MG/DL (ref 40–59)
LDLC SERPL CALC-MCNC: 150 MG/DL (ref ?–100)
NONHDLC SERPL-MCNC: 170 MG/DL (ref ?–130)
OSMOLALITY SERPL CALC.SUM OF ELEC: 295 MOSM/KG (ref 275–295)
POTASSIUM SERPL-SCNC: 4.2 MMOL/L (ref 3.5–5.1)
SODIUM SERPL-SCNC: 142 MMOL/L (ref 136–145)
TRIGL SERPL-MCNC: 110 MG/DL (ref 30–149)
TSI SER-ACNC: 2.2 UIU/ML (ref 0.55–4.78)
VLDLC SERPL CALC-MCNC: 21 MG/DL (ref 0–30)

## 2025-04-26 PROCEDURE — 84443 ASSAY THYROID STIM HORMONE: CPT

## 2025-04-26 PROCEDURE — 80048 BASIC METABOLIC PNL TOTAL CA: CPT

## 2025-04-26 PROCEDURE — 84450 TRANSFERASE (AST) (SGOT): CPT

## 2025-04-26 PROCEDURE — 80061 LIPID PANEL: CPT

## 2025-04-26 PROCEDURE — 36415 COLL VENOUS BLD VENIPUNCTURE: CPT

## 2025-04-26 PROCEDURE — 84460 ALANINE AMINO (ALT) (SGPT): CPT

## 2025-04-29 ENCOUNTER — TELEPHONE (OUTPATIENT)
Facility: CLINIC | Age: 65
End: 2025-04-29

## 2025-04-29 NOTE — TELEPHONE ENCOUNTER
----- Message from Beto Roca sent at 4/28/2025  5:35 PM CDT -----  I wanted to get back to you with your colonoscopy results.  You had 2 colon polyps removed which were benign.  I would advise a repeat colonoscopy in 5 years to make sure no new polyps are forming.      You also have internal hemorrhoids and diverticulosis.  Please stay on a high fiber diet and call with any questions.     ----- Message -----  From: Lab, Background User  Sent: 4/28/2025  10:54 AM CDT  To: Beto Roca MD

## 2025-04-29 NOTE — TELEPHONE ENCOUNTER
Seen by patient Frida Bowles on 4/28/2025  5:56 PM     Health maintenance updated.  5 year colonoscopy recall placed in patient outreach.Next due on 04/25/2030 per Dr. Roca.

## 2025-04-30 ENCOUNTER — TELEPHONE (OUTPATIENT)
Dept: FAMILY MEDICINE CLINIC | Facility: CLINIC | Age: 65
End: 2025-04-30

## 2025-04-30 NOTE — TELEPHONE ENCOUNTER
Attempted to call patient. Had to leave a message to call back if any questions. Transfer to available RN with  otherwise (I can be reached [phone])    Per message below, cholesterol is elevated and Dr Decker sent rosuvastatin 10mg to pharmacy on 4/29/25. She is to return and repeat labs in 3 months (order is in chart for lipid, alt, ast)  the rest of labs are normal.       Cholesterol elevado mande receta por rosuvastatin.  Regrese para chequear vick en 3 meses.  Ademas resultados son normales.    Written by Javed Decker DO on 4/29/2025  1:46 PM CDT  Seen by patient rFida Bowles on 4/30/2025  8:27 AM

## 2025-04-30 NOTE — TELEPHONE ENCOUNTER
Patient calling (and assistance from  [facility] number [id number] accessed). verified full name and date of birth).  Relayed Dr. Decker message and patient voiced understanding with treatment plan

## 2025-04-30 NOTE — TELEPHONE ENCOUNTER
Patient calling ( name and date of birth of patient verified ) would like to review her test results a noted in MyChart     Explained to the patient the notes are all written in Peruvian and this  RN does not read Peruvian     Will have Peruvian fluent nurse call her back soon     Patient verbalizes understanding and agrees with plan.       Cherrie - can you assist this patient please and thank you

## 2025-06-13 ENCOUNTER — HOSPITAL ENCOUNTER (OUTPATIENT)
Dept: MAMMOGRAPHY | Age: 65
Discharge: HOME | End: 2025-06-13
Attending: FAMILY MEDICINE
Payer: COMMERCIAL

## 2025-06-13 DIAGNOSIS — Z12.31 SCREENING MAMMOGRAM, ENCOUNTER FOR: ICD-10-CM

## 2025-06-13 PROCEDURE — 77063 BREAST TOMOSYNTHESIS BI: CPT | Performed by: FAMILY MEDICINE

## 2025-06-13 PROCEDURE — 77067 SCR MAMMO BI INCL CAD: CPT | Performed by: FAMILY MEDICINE

## 2025-08-04 ENCOUNTER — HOSPITAL ENCOUNTER (OUTPATIENT)
Dept: MAMMOGRAPHY | Facility: HOSPITAL | Age: 65
Discharge: HOME | End: 2025-08-04
Attending: FAMILY MEDICINE

## 2025-08-04 DIAGNOSIS — R92.8 ABNORMAL MAMMOGRAM: ICD-10-CM

## 2025-08-04 PROCEDURE — 77065 DX MAMMO INCL CAD UNI: CPT | Performed by: FAMILY MEDICINE

## 2025-08-04 PROCEDURE — 77061 BREAST TOMOSYNTHESIS UNI: CPT | Performed by: FAMILY MEDICINE

## (undated) DEVICE — GIJAW SINGLE-USE BIOPSY FORCEPS WITH NEEDLE: Brand: GIJAW

## (undated) DEVICE — MEDI-VAC NON-CONDUCTIVE SUCTION TUBING 6MM X 1.8M (6FT.) L: Brand: CARDINAL HEALTH

## (undated) DEVICE — LINE MNTR ADLT SET O2 INTMD

## (undated) DEVICE — KIT ENDO ORCAPOD 160/180/190

## (undated) DEVICE — 60 ML SYRINGE REGULAR TIP: Brand: MONOJECT

## (undated) DEVICE — V2 SPECIMEN COLLECTION MANIFOLD KIT: Brand: NEPTUNE

## (undated) DEVICE — Device: Brand: CUSTOM PROCEDURE KIT

## (undated) DEVICE — SNARE CAPTIFLEX MICRO-OVL OLY

## (undated) DEVICE — TRAP 4 CPTR CHMBR N EZ INLN

## (undated) DEVICE — Device

## (undated) DEVICE — FORCEP RADIAL JAW 4

## (undated) DEVICE — KIT CLEAN ENDOKIT 1.1OZ GOWNX2

## (undated) NOTE — LETTER
No referring provider defined for this encounter. 07/01/22        Patient: Darya James   YOB: 1960   Date of Visit: 7/1/2022       Dear  Dr. Darnell Coates,      Thank you for referring Darya James to my practice. Please find my assessment and plan below. ASSESSMENT AND PLAN    1. Right ear pain  Appears to be more musculoskeletal in nature. Start Celebrex cyclobenzaprine warm heat soft diet chewing both sides of mouth continue using bite guard. Return to see me in 1 month. Hydration and sialagogues for what may be submandibular gland inflammation. Sincerely,   Leander Clay. Dean Pandya MD   [address] [place] [address]  [address] [facility] [address] [address]    Document electronically generated by:  Leander Clay.  Dean Pandya MD

## (undated) NOTE — LETTER
01/24/22        [address],    Nuestros registros indican que tiene análisis clínicos pendientes y/o pruebas que se ordenaron en mackay nombre que no se hernández completado.     Para brindarle la mej

## (undated) NOTE — LETTER
03/30/20        [address] [id number]      Dear Katelyn Gonzalez,    [address] records indicate that you have outstanding lab work and or testing that was ordered for you and has not yet been completed:  Orders Placed This Encounter

## (undated) NOTE — LETTER
1/15/2024              Frida Bowles        [address]        [facility] [address]         Dear Frida,    This letter is to inform you that our office has made several attempts to reach you by phone without success.  We were attempting to contact you by phone to review screening questions to help schedule the colonoscopy that is due.    Please contact our office at the number listed below as soon as you receive this letter to discuss this issue and to make the necessary changes in our system to your contact information.  Thank you for your cooperation.        Sincerely,    Beto Roca MD  [facility] MEDICAL [address]  [facility] [id number] [phone]

## (undated) NOTE — LETTER
[facility]   Administracion de Anestesia  Yo, Frida S Bowles, acepto ser atendido por un anestesiólogo, quien está especialmente capacitado para monitorearme y darme medicamento para hacerme dormir o mantenerme cómodo yancy mi procedimiento.   Entiendo que mi anestesiólogo no es un empleado o agente de [facility] o [facility]. Él o yuly trabaja para [facility]  [place] el paciente que solicita los servicios de anestesia, estoy de acuerdo con lo siguiente:  Permitir al anestesiólogo (médico de anestesia) que me suministre el medicamento y hacer los procedimientos adicionales que miri necesarios. Algunos ejemplos son: Al iniciar o utilizar dragan “IV” para suministrarme medicamentos, fluidos o vick yancy mi procedimiento, y colocarme dragan sonda de respiración, me ayudará a respirar cuando esté dormido (intubación). En el damari de que mi corazón deje de funcionar correctamente, entiendo que mi anestesiólogo hará todo lo posible para salvar mi vandana, a menos que los documentos de No resucitar de [facility] lo indiquen de otra manera.  Informar a mi anestesista antes del procedimiento:  Si estoy embarazada.  La última vez que comí o bebí algo.  Todos los medicamentos que aidan (incluyendo medicamentos recetados, suplementos herbales y pastillas que puedo comprar sin receta médica (incluyendo drogas en la dela cruz [medicamentos ilegales]). No informar a mi anestesiólogo acerca de estos medicamentos puede aumentar mi riesgo de complicaciones con la anestesia.  Si soy alérgico a cualquier cosa o he tenido previamente dragan reacción adversa a la anestesia.  Entiendo cómo la anestesia me ayudará (beneficios).  Entiendo que con cualquier tipo de anestesia hay riesgos:  Los riesgos más comunes son: náuseas, vómitos, dolor de garganta, dolor muscular, daño a los ojos, la boca o los dientes (por la colocación de la sonda de respiración).  Los riesgos poco comunes incluyen: recordar  lo que sucedió yancy mi procedimiento, reacciones alérgicas a los medicamentos, lesiones en las vías respiratorias, el corazón, los pulmones, la visión, los nervios o músculos, y en casos sumamente raros, la muerte.  Mii médico me ha explicado otras opciones de atención disponibles para mí (alternativas).  Pacientes embarazadas (“epidural”):    Entiendo que los riesgos de recibir dragan inyección epidural (medicamento que se aplica en la espalda para ayudar a controlar el dolor yancy el parto), incluyen picazón, presión arterial baja, dificultad para orinar, dolor de rodolfo o disminución en el ritmo del corazón del bebé. Los riesgos muy poco comunes incluyen infección, hemorragia, convulsiones, ritmo cardíaco irregular y lesión del nervio.  Anestesia regional (“columna vertebral”, “epidural” y “bloqueo de los nervios”):  Entiendo que hay complicaciones poco frecuentes anthony posibles, e incluyen dolor de rodolfo, sangrado, infección, convulsiones, ritmo cardíaco irregular y la lesión del nervio.  .   Puedo cambiar de opinión acerca de recibir servicios de anestesia en cualquier momento antes de que se me administre el medicamento.         Paciente (o representante) Firma/Relación con el paciente  Fecha  Hora  Patient Signature/Signature of Responsible person Date Time           Nombre del intérprete (en mackay damari)  Idioma/Organización  Hora  Name of  Language/Organization Time          Firma del anestesiólogo Fecha  Hora  Signature of anesthesiologist Date Time    He hablado del procedimiento y la información anterior con el paciente (o el representante del paciente) y respondí mak preguntas. El paciente o mackay representante ha aceptado recibir los servicios de anestesia.         Testigo Fecha  Hora  Witness Date Time  He verificado que la firma es la del paciente o del representante del paciente, y que se firmó antes del procedimiento.    Nombre del paciente: Frida Bowles Fec. de Nac.: 12/17/1960                  En letra de imprenta: April 24, 2025  Expediente médico No. [id number]                         Página 1 de 2  ----------ANESTHESIA CONSENT----------

## (undated) NOTE — MR AVS SNAPSHOT
After Visit Summary   8/17/2020    Melanie Conti    MRN: [id number]           Visit Information     Date & Time  8/17/2020  3:40 PM Provider  Suzette Smiley MD Department  [address]  Phone  [phone]      Your Dear Jodie Ledezma : Thank you for enrolling in [address] Please follow the instructions below to securely access your online medical record. [employer] allows you to send messages to your doctor, view test results, renew prescriptions and request appointments. Don’t eat while when you’re bored.      EAT THESE FOODS MORE OFTEN: EAT THESE FOODS LESS OFTEN:   Make half your plate fruits and vegetables Highly refined, white starches including white bread, rice and pasta   Eat plenty of protein, keep the fat content l non-emergency, consider your options before heading to an ER. VIDEO VISITS  Visit face-to-face with a [facility] physician or   MARIAM using your mobile device or computer   using [employer].    e-VISITS  Communicate with a [facility] Physician or MARIAM online.  The physician jhon

## (undated) NOTE — LETTER
01/29/20        Adarsh Arrieta      Dear Jodie Ledezma,    [address] [facility] records indicate that you have outstanding lab work and or testing that was ordered for you and has not yet been completed:  Orders Placed This Encounter           C

## (undated) NOTE — Clinical Note
[place] [facility]  Administration of Anesthesia  1. IFrida agree to be cared for by a physician anesthesiologist alone and/or with a nurse anesthetist, who is specially trained to monitor me and give me medicine to put me to sleep or keep me comfortable during my procedure    I understand that my anesthesiologist and/or anesthetist is not an employee or agent of [facility] or [employer]. He or she works for [facility]    2. As the patient asking for anesthesia services, I agree to:  a. Allow the anesthesiologist (anesthesia doctor) to give me medicine and do additional procedures as necessary. Some examples are: Starting or using an “IV” to give me medicine, fluids or blood during my procedure, and having a breathing tube placed to help me breathe when I’m asleep (intubation). In the event that my heart stops working properly, I understand that my anesthesiologist will make every effort to sustain my life, unless otherwise directed by [facility] Do Not Resuscitate documents.  b. Tell my anesthesia doctor before my procedure:  i. If I am pregnant.  ii. The last time that I ate or drank.  iii. All of the medicines I take (including prescriptions, herbal supplements, and pills I can buy without a prescription (including street drugs/illegal medications). Failure to inform my anesthesiologist about these medicines may increase my risk of anesthetic complications.  iv.If I am allergic to anything or have had a reaction to anesthesia before.  3. I understand how the anesthesia medicine will help me (benefits).  4. I understand that with any type of anesthesia medicine there are risks:  a. The most common risks are: nausea, vomiting, sore throat, muscle soreness, damage to my eyes, mouth, or teeth (from breathing tube placement).  b. Rare risks include: remembering what happened during my procedure, allergic reactions to medications, injury to my airway, heart,  lungs, vision, nerves, or muscles and in extremely rare instances death.  5. My doctor has explained to me other choices available to me for my care (alternatives).  6. Pregnant Patients (“epidural”):  I understand that the risks of having an epidural (medicine given into my back to help control pain during labor), include itching, low blood pressure, difficulty urinating, headache or slowing of the baby’s heart. Very rare risks include infection, bleeding, seizure, irregular heart rhythms and nerve injury.  7. Regional Anesthesia (“spinal”, “epidural”, & “nerve blocks”):  I understand that rare but potential complications include headache, bleeding, infection, seizure, irregular heart rhythms, and nerve injury.    _____________________________________________________________________________  Patient (or Representative) Signature/Relationship to Patient  Date   Time    _____________________________________________________________________________   Name (if used)    Language/Organization   Time    _____________________________________________________________________________  Nurse Anesthetist Signature     Date   Time  _____________________________________________________________________________  Anesthesiologist Signature     Date   Time  I have discussed the procedure and information above with the patient (or patient’s representative) and answered their questions. The patient or their representative has agreed to have anesthesia services.    _____________________________________________________________________________  Witness        Date   Time  I have verified that the signature is that of the patient or patient’s representative, and that it was signed before the procedure  Patient Name: Frida Bowles     : 1960                 Printed: 2025 at 6:20 AM    Medical Record #: [id number]                                            Page 1 of 1  ----------ANESTHESIA CONSENT----------

## (undated) NOTE — LETTER
1/15/2024              Frida Bowles        [address]        [place] [address]         Estimada Frida,    Le enviamos esta carta para informarle que nuestra oficina mullins intentado ponerse en contacto con usted por teléfono sin éxito. El motivo de la llamada era para informarle sobre programar dragan colonoscopia.  Por favor, comuníquese con nuestra oficina llamando al número ubicado debajo para hablar sobre margie sintia y para realizar los cambios necesarios a mackay información de contacto en nuestro sistema. [place] por mackay ayuda.    Sinceramente,    Beto Roca MD  [facility] [address]  [facility] [address] [phone]

## (undated) NOTE — LETTER
07/02/20    Tammy Rice  [facility]      Dear Zeina Austin,    [facility] records indicate that you have outstanding lab work and or testing that was ordered for you and has not yet been completed:  Orders Placed This Encounter      CT CALCIUM S

## (undated) NOTE — LETTER
11/20/2017              [address] [facility]         Dear Melba Herbert,    It was a pleasure to see you. Your mammogram was normal.  There is no need for further testing at this time.   I look forward to seeing you at you

## (undated) NOTE — MR AVS SNAPSHOT
[facility] SPECIALTY \[facility]\"" - [place] [address] [address] [address]  [phone]               Thank you for choosing us for your health care visit with NOEL Woodard.   We are glad to serve you and happy to provide you with this summary of Enter your [email or web address] Activation Code exactly as it appears below along with your Zip Code and Date of Birth to complete the sign-up process. If you do not sign up before the expiration date, you must request a new code.     Your unique [email or web address] Access Code: [email or web address]

## (undated) NOTE — LETTER
Patient Name: Frida Bowles : 1960  Medical Record #: [id number] Printed: 2025  Page 1 of 2                                          [facility]  [address], [place], [place]  Autorización para operación y procedimiento quirúrgico                                                                                                      Por la presente, autorizo a Beto Roca MD, mi médico y al asistente a realizar la operación/procedimiento quirúrgico a continuación, así lina a administrar la anestesia que determine necesaria mi médico Nombre (s) de la operación/procedimiento: COLONOSCOPY en Frida Bowles     Reconozco que yancy la operación/procedimiento quirúrgico, las condiciones imprevistas pueden requerir de procedimientos adicionales o diferentes a aquellos mencionados anteriormente.  Por lo tanto, autorizo y solicito además que el cirujano antes mencionado, los asistentes o las personas designadas realicen los procedimientos que, a mackay juicio, miri necesarios y convenientes.    Mi cirujano/médico mullins discutido antes de mi cirugía los posibles beneficios, riesgos y efectos secundarios de margie procedimiento, la probabilidad de alcanzar las metas y los posibles problemas que puedan ocurrir yancy la recuperación.  Ellos también hernández discutido las alternativas razonables al procedimiento, incluso los riesgos, beneficios y efectos secundarios relacionados con las alternativas y los riesgos relacionados con no realizar margie procedimiento.  Hernández respondido a todas mis preguntas y confirmo que no se ha dado ninguna garantía en cuanto a los resultados que pueda obtener.    En damari de que surja la necesidad yancy mi operación o yancy el periodo postoperatorio, también autorizo se aplique vick y/o productos sanguíneos.  Asimismo, entiendo que a pesar de las cuidadosas pruebas y análisis de vick o de los productos sanguíneos que realizan las entidades recolectoras, todavía puedo  estar sujeto a efectos adversos lina resultado de recibir dragan transfusión de vick y/o productos sanguíneos.  A continuación se mencionan algunos, aunque no todos, los riesgos potenciales que pueden ocurrir: fiebre y reacciones alérgicas, reacciones hemolíticas, trasmisión de enfermedades lina hepatitis, SIDA y citomegalovirus (CMV), así lina sobrecarga de líquidos.   En damari de que desee tener dragan transfusión autóloga de mi propia vick o dragan transfusión de un donante dirigido.  Lo discutiré con mi médico.  Check only if Refusing Blood or Blood Products  I understand refusal of blood or blood products as deemed necessary by my physician may have serious consequences to my condition to include possible death. I hereby assume responsibility for my refusal and release the hospital, its personnel, and my physicians from any responsibility for the consequences of my refusal.           o  Refuse       Autorizo el uso de cualquier muestra, órgano, tejido, parte del cuerpo u objeto extraño que pueda ser extraído de mi cuerpo yancy la operación/procedimiento para fines de diagnóstico, investigación o de enseñanza y mackay desecho posterior por las autoridades del hospital.  También, autorizo la revelación de los resultados de las pruebas de muestras y/o los informes escritos al médico tratante lina personal médico del hospital u otros médicos de referencia o consulta involucrados en mi atención, a discreción del patólogo o de mi médico tratante.    Doy consentimiento para que se fotografíen o graben vídeos de las operaciones o procedimientos a realizarse, incluidas las partes de mi cuerpo que miri adecuadas para propósitos médicos, científicos o educativos, en el entendido de que, mi identidad no sea revelada por las fotografías o por textos descriptivos que las acompañen.  Si el procedimiento es fotografiado o grabado en vídeo, el cirujano obtendrá la imagen, cinta de vídeo o CD original.  El hospital no se hará  responsable por el almacenamiento, la revelación o el mantenimiento de la imagen, cinta o CD.    Autorizo la presencia de un especialista de producto u observadores en el quirófano, según lo considere necesario mi médico o las personas que éste designe.     Reconozco que en damari de que mi procedimiento resulte en un tiempo prolongado de radiografía/fluoroscopia, puedo desarrollar dragan reacción en la piel.    Si tengo dragan orden de No intentar la reanimación (JOHN), zcahary estado se suspenderá mientras esté en el quirófano, la yanet de procedimientos y yancy el periodo de recuperación a menos que yo indique lo contrario explícitamente (o dragan persona autorizada a jabari el consentimiento en mi nombre). El cirujano o mi médico tratante determinarán cuándo termina el periodo de recuperación aplicable a los efectos de restablecer la orden de JOHN.  Pacientes que se realizan un procedimiento de esterilización: Entiendo que si el procedimiento tiene éxito, los resultados serán permanentes y que, por lo tanto, me será imposible inseminar, concebir o tener hijos.  Asimismo, entiendo que el procedimiento tiene lina propósito la esterilidad, aunque el resultado no está garantizado.   Admito que mi médico me ha explicado la aplicación de sedación/analgesia, incluidos los riesgos y beneficios y, consiento a la administración de sedación/analgesia conforme sea necesario o conveniente a juicio de mi médico.      CERTIFICO QUE HE LEÍDO Y COMPRENDIDO EL CONSENTIMIENTO ANTERIOR PARA LA OPERACIÓN y/o PROCEDIMIENTO.             ______________________________________  _______________________________  Firma del paciente      Firma de la persona responsible  Signature of patient      Signature of responsible person                        ___________________________________                                   Nombre en imprenta de la persona responsible         Name of responsible person          ___________________________________                  Relación con el paciente         Relationship to patient    _________________________________________  ______________ ________________  Firma del testigo          Fecha / Date Hora / Time  Signature of witness    DECLARACÓN DEL MÉDICO Mediante mi firma al calce, afirmo que antes de la hora del procedimiento, he explicadoal paciente y/o a mackay representante legal, los riesgos y beneficios involucrados en el tratamiento propuesto, así comocualquier alternativa razonable al tratamiento propuesto. También le(s) he explicado los riesgos y beneficios involucradosen el rechazo del tratarniento propuesto y alternativas al tratamiento propuesto, y he respondido a las preguntas del(la) paciente(My signature below affirms that prior to the time of the procedure, I have explained to the patient and/or his/her guardian, therisks and benefits involved in the proposed treatment and any reasonable alternative to the proposed treatment. I have alsoexplained the risks and benefits involved in refusal of the proposed treatment and have answered the patient's questions.)    ________________________________________   _________________________   _____________   (Firma del médico/Signature of Physician)                                    (Fecha/Date)                                             (Hora/Time)      Patient Name: Frida Bowles     : 1960                 Printed: 2025      Medical Record #: [id number]                                              Page 2 of 2

## (undated) NOTE — LETTER
12/27/21        [address],    Nuestros registros indican que tiene análisis clínicos pendientes y/o pruebas que se ordenaron en mackay nombre que no se hernández completado.     Para brindarle la mej

## (undated) NOTE — LETTER
8/20/2020              [address] [facility],    Fue un placer verla. Jimenez resultado del Papan[place] es normal con virus de papiloma negativo. No necesita más pruebas en margie momento.  Espero mina

## (undated) NOTE — LETTER
11/20/2023    Stanley Peña        [address] [facility]            Dear Stanley Peña,      Our records indicate that you are due for an appointment for a Colonoscopy with Shana Tatum MD. Our doctors are booking out about 3-6 months in advance for procedures. Please call our office to schedule a phone screening appointment to plan for the procedure(s). Your medical well-being is important to us. If your insurance requires a referral, please call your primary care office to request one.       Thank you,      The Physicians and Staff at [facility]